# Patient Record
Sex: FEMALE | Race: WHITE
[De-identification: names, ages, dates, MRNs, and addresses within clinical notes are randomized per-mention and may not be internally consistent; named-entity substitution may affect disease eponyms.]

---

## 2017-03-15 ENCOUNTER — APPOINTMENT (OUTPATIENT)
Dept: FAMILY MEDICINE | Facility: CLINIC | Age: 67
End: 2017-03-15
Payer: MEDICARE

## 2017-03-15 VITALS
BODY MASS INDEX: 34.87 KG/M2 | OXYGEN SATURATION: 95 % | HEIGHT: 66 IN | WEIGHT: 217 LBS | DIASTOLIC BLOOD PRESSURE: 73 MMHG | SYSTOLIC BLOOD PRESSURE: 166 MMHG | HEART RATE: 67 BPM | TEMPERATURE: 97.5 F

## 2017-03-15 DIAGNOSIS — Z82.49 FAMILY HISTORY OF ISCHEMIC HEART DISEASE AND OTHER DISEASES OF THE CIRCULATORY SYSTEM: ICD-10-CM

## 2017-03-15 DIAGNOSIS — I87.2 VENOUS INSUFFICIENCY (CHRONIC) (PERIPHERAL): ICD-10-CM

## 2017-03-15 DIAGNOSIS — Z85.828 PERSONAL HISTORY OF OTHER MALIGNANT NEOPLASM OF SKIN: ICD-10-CM

## 2017-03-15 DIAGNOSIS — K76.0 FATTY (CHANGE OF) LIVER, NOT ELSEWHERE CLASSIFIED: ICD-10-CM

## 2017-03-15 LAB — HBA1C MFR BLD HPLC: 7.1

## 2017-03-15 PROCEDURE — 83036 HEMOGLOBIN GLYCOSYLATED A1C: CPT | Mod: QW

## 2017-03-15 PROCEDURE — 99203 OFFICE O/P NEW LOW 30 MIN: CPT | Mod: 25

## 2017-03-15 RX ORDER — ASPIRIN 81 MG/1
81 TABLET ORAL DAILY
Qty: 90 | Refills: 1 | Status: ACTIVE | COMMUNITY
Start: 2017-03-15

## 2017-03-24 ENCOUNTER — MEDICATION RENEWAL (OUTPATIENT)
Age: 67
End: 2017-03-24

## 2017-05-11 ENCOUNTER — RX RENEWAL (OUTPATIENT)
Age: 67
End: 2017-05-11

## 2017-05-15 ENCOUNTER — RX RENEWAL (OUTPATIENT)
Age: 67
End: 2017-05-15

## 2017-06-05 ENCOUNTER — APPOINTMENT (OUTPATIENT)
Dept: FAMILY MEDICINE | Facility: CLINIC | Age: 67
End: 2017-06-05
Payer: MEDICARE

## 2017-06-05 VITALS
WEIGHT: 215 LBS | DIASTOLIC BLOOD PRESSURE: 74 MMHG | SYSTOLIC BLOOD PRESSURE: 137 MMHG | HEART RATE: 67 BPM | TEMPERATURE: 98 F | BODY MASS INDEX: 34.55 KG/M2 | OXYGEN SATURATION: 94 % | HEIGHT: 66 IN

## 2017-06-05 LAB — HBA1C MFR BLD HPLC: 6.6

## 2017-06-05 PROCEDURE — 36415 COLL VENOUS BLD VENIPUNCTURE: CPT

## 2017-06-05 PROCEDURE — 83036 HEMOGLOBIN GLYCOSYLATED A1C: CPT | Mod: QW

## 2017-06-05 PROCEDURE — 99214 OFFICE O/P EST MOD 30 MIN: CPT | Mod: 25

## 2017-06-09 LAB
ALBUMIN SERPL ELPH-MCNC: 4.1 G/DL
ALP BLD-CCNC: 113 U/L
ALT SERPL-CCNC: 9 U/L
ANION GAP SERPL CALC-SCNC: 23 MMOL/L
AST SERPL-CCNC: 14 U/L
BILIRUB SERPL-MCNC: 0.3 MG/DL
BUN SERPL-MCNC: 18 MG/DL
CALCIUM SERPL-MCNC: 10 MG/DL
CHLORIDE SERPL-SCNC: 90 MMOL/L
CHOLEST SERPL-MCNC: 161 MG/DL
CHOLEST/HDLC SERPL: 2.2 RATIO
CO2 SERPL-SCNC: 21 MMOL/L
CREAT SERPL-MCNC: 0.96 MG/DL
GLUCOSE SERPL-MCNC: 125 MG/DL
HDLC SERPL-MCNC: 72 MG/DL
LDLC SERPL CALC-MCNC: 77 MG/DL
POTASSIUM SERPL-SCNC: 3.9 MMOL/L
PROT SERPL-MCNC: 7.7 G/DL
SODIUM SERPL-SCNC: 134 MMOL/L
TRIGL SERPL-MCNC: 61 MG/DL

## 2017-06-13 ENCOUNTER — RX RENEWAL (OUTPATIENT)
Age: 67
End: 2017-06-13

## 2017-08-02 ENCOUNTER — RX RENEWAL (OUTPATIENT)
Age: 67
End: 2017-08-02

## 2017-08-21 ENCOUNTER — RX RENEWAL (OUTPATIENT)
Age: 67
End: 2017-08-21

## 2017-09-06 ENCOUNTER — APPOINTMENT (OUTPATIENT)
Dept: FAMILY MEDICINE | Facility: CLINIC | Age: 67
End: 2017-09-06
Payer: MEDICARE

## 2017-09-06 ENCOUNTER — MED ADMIN CHARGE (OUTPATIENT)
Age: 67
End: 2017-09-06

## 2017-09-06 VITALS
DIASTOLIC BLOOD PRESSURE: 73 MMHG | HEIGHT: 66 IN | WEIGHT: 217.8 LBS | HEART RATE: 70 BPM | SYSTOLIC BLOOD PRESSURE: 120 MMHG | BODY MASS INDEX: 35 KG/M2 | OXYGEN SATURATION: 95 % | TEMPERATURE: 97.9 F

## 2017-09-06 LAB — HBA1C MFR BLD HPLC: 6.7

## 2017-09-06 PROCEDURE — 99214 OFFICE O/P EST MOD 30 MIN: CPT | Mod: 25

## 2017-09-06 PROCEDURE — 90662 IIV NO PRSV INCREASED AG IM: CPT

## 2017-09-06 PROCEDURE — 83036 HEMOGLOBIN GLYCOSYLATED A1C: CPT | Mod: QW

## 2017-09-06 PROCEDURE — G0008: CPT

## 2017-09-18 ENCOUNTER — RX RENEWAL (OUTPATIENT)
Age: 67
End: 2017-09-18

## 2017-09-18 RX ORDER — ROSUVASTATIN CALCIUM 10 MG/1
10 TABLET, FILM COATED ORAL
Qty: 90 | Refills: 1 | Status: DISCONTINUED | COMMUNITY
Start: 2017-03-15 | End: 2017-09-18

## 2017-09-22 ENCOUNTER — APPOINTMENT (OUTPATIENT)
Dept: CHRONIC DISEASE MANAGEMENT | Facility: CLINIC | Age: 67
End: 2017-09-22

## 2017-12-06 ENCOUNTER — APPOINTMENT (OUTPATIENT)
Dept: FAMILY MEDICINE | Facility: CLINIC | Age: 67
End: 2017-12-06
Payer: MEDICARE

## 2017-12-06 VITALS
WEIGHT: 215 LBS | HEIGHT: 66 IN | TEMPERATURE: 97.7 F | DIASTOLIC BLOOD PRESSURE: 79 MMHG | HEART RATE: 87 BPM | BODY MASS INDEX: 34.55 KG/M2 | OXYGEN SATURATION: 95 % | SYSTOLIC BLOOD PRESSURE: 160 MMHG

## 2017-12-06 LAB — HBA1C MFR BLD HPLC: 7.1

## 2017-12-06 PROCEDURE — G0438: CPT

## 2017-12-06 PROCEDURE — G0402 INITIAL PREVENTIVE EXAM: CPT

## 2017-12-06 PROCEDURE — 36415 COLL VENOUS BLD VENIPUNCTURE: CPT

## 2017-12-06 PROCEDURE — 83036 HEMOGLOBIN GLYCOSYLATED A1C: CPT | Mod: QW

## 2017-12-07 LAB
25(OH)D3 SERPL-MCNC: 34.5 NG/ML
ALBUMIN SERPL ELPH-MCNC: 4.3 G/DL
ALP BLD-CCNC: 117 U/L
ALT SERPL-CCNC: 10 U/L
ANION GAP SERPL CALC-SCNC: 16 MMOL/L
AST SERPL-CCNC: 14 U/L
BASOPHILS # BLD AUTO: 0.02 K/UL
BASOPHILS NFR BLD AUTO: 0.3 %
BILIRUB SERPL-MCNC: 0.4 MG/DL
BUN SERPL-MCNC: 16 MG/DL
CALCIUM SERPL-MCNC: 10.1 MG/DL
CHLORIDE SERPL-SCNC: 95 MMOL/L
CHOLEST SERPL-MCNC: 174 MG/DL
CHOLEST/HDLC SERPL: 2.4 RATIO
CO2 SERPL-SCNC: 25 MMOL/L
CREAT SERPL-MCNC: 0.91 MG/DL
CREAT SPEC-SCNC: 201 MG/DL
EOSINOPHIL # BLD AUTO: 0.21 K/UL
EOSINOPHIL NFR BLD AUTO: 3.1 %
GLUCOSE SERPL-MCNC: 142 MG/DL
HCT VFR BLD CALC: 37.7 %
HDLC SERPL-MCNC: 74 MG/DL
HGB BLD-MCNC: 12.7 G/DL
IMM GRANULOCYTES NFR BLD AUTO: 0.1 %
LDLC SERPL CALC-MCNC: 85 MG/DL
LYMPHOCYTES # BLD AUTO: 1.31 K/UL
LYMPHOCYTES NFR BLD AUTO: 19.2 %
MAN DIFF?: NORMAL
MCHC RBC-ENTMCNC: 28.5 PG
MCHC RBC-ENTMCNC: 33.7 GM/DL
MCV RBC AUTO: 84.7 FL
MICROALBUMIN 24H UR DL<=1MG/L-MCNC: 4.4 MG/DL
MICROALBUMIN/CREAT 24H UR-RTO: 22 MG/G
MONOCYTES # BLD AUTO: 0.5 K/UL
MONOCYTES NFR BLD AUTO: 7.3 %
NEUTROPHILS # BLD AUTO: 4.77 K/UL
NEUTROPHILS NFR BLD AUTO: 70 %
PLATELET # BLD AUTO: 356 K/UL
POTASSIUM SERPL-SCNC: 4.4 MMOL/L
PROT SERPL-MCNC: 8.3 G/DL
RBC # BLD: 4.45 M/UL
RBC # FLD: 13.5 %
SODIUM SERPL-SCNC: 136 MMOL/L
TRIGL SERPL-MCNC: 75 MG/DL
TSH SERPL-ACNC: 4.43 UIU/ML
WBC # FLD AUTO: 6.82 K/UL

## 2017-12-11 ENCOUNTER — RX RENEWAL (OUTPATIENT)
Age: 67
End: 2017-12-11

## 2018-03-12 ENCOUNTER — RX RENEWAL (OUTPATIENT)
Age: 68
End: 2018-03-12

## 2018-03-21 ENCOUNTER — APPOINTMENT (OUTPATIENT)
Dept: FAMILY MEDICINE | Facility: CLINIC | Age: 68
End: 2018-03-21
Payer: MEDICARE

## 2018-03-21 VITALS
DIASTOLIC BLOOD PRESSURE: 78 MMHG | BODY MASS INDEX: 34.55 KG/M2 | HEIGHT: 66 IN | SYSTOLIC BLOOD PRESSURE: 147 MMHG | OXYGEN SATURATION: 95 % | WEIGHT: 215 LBS | HEART RATE: 75 BPM | TEMPERATURE: 97.9 F

## 2018-03-21 LAB — HBA1C MFR BLD HPLC: 7.2

## 2018-03-21 PROCEDURE — 83036 HEMOGLOBIN GLYCOSYLATED A1C: CPT | Mod: QW

## 2018-03-21 PROCEDURE — 99214 OFFICE O/P EST MOD 30 MIN: CPT | Mod: 25

## 2018-03-21 PROCEDURE — 93000 ELECTROCARDIOGRAM COMPLETE: CPT

## 2018-05-18 ENCOUNTER — RX RENEWAL (OUTPATIENT)
Age: 68
End: 2018-05-18

## 2018-05-19 ENCOUNTER — RX RENEWAL (OUTPATIENT)
Age: 68
End: 2018-05-19

## 2018-06-08 ENCOUNTER — RX RENEWAL (OUTPATIENT)
Age: 68
End: 2018-06-08

## 2018-07-16 ENCOUNTER — RX RENEWAL (OUTPATIENT)
Age: 68
End: 2018-07-16

## 2018-07-22 PROBLEM — Z85.828 HISTORY OF BASAL CELL CARCINOMA: Status: RESOLVED | Noted: 2017-03-15 | Resolved: 2018-07-22

## 2018-07-25 ENCOUNTER — MED ADMIN CHARGE (OUTPATIENT)
Age: 68
End: 2018-07-25

## 2018-07-25 ENCOUNTER — APPOINTMENT (OUTPATIENT)
Dept: FAMILY MEDICINE | Facility: CLINIC | Age: 68
End: 2018-07-25
Payer: MEDICARE

## 2018-07-25 VITALS
HEART RATE: 63 BPM | WEIGHT: 210 LBS | SYSTOLIC BLOOD PRESSURE: 143 MMHG | DIASTOLIC BLOOD PRESSURE: 64 MMHG | BODY MASS INDEX: 33.75 KG/M2 | HEIGHT: 66 IN | OXYGEN SATURATION: 97 % | TEMPERATURE: 97.9 F

## 2018-07-25 LAB — HBA1C MFR BLD HPLC: 7

## 2018-07-25 PROCEDURE — 83036 HEMOGLOBIN GLYCOSYLATED A1C: CPT | Mod: QW

## 2018-07-25 PROCEDURE — 90715 TDAP VACCINE 7 YRS/> IM: CPT | Mod: GY

## 2018-07-25 PROCEDURE — 90471 IMMUNIZATION ADMIN: CPT | Mod: GY

## 2018-07-25 PROCEDURE — 99214 OFFICE O/P EST MOD 30 MIN: CPT | Mod: 25

## 2018-07-25 PROCEDURE — 36415 COLL VENOUS BLD VENIPUNCTURE: CPT

## 2018-07-25 NOTE — PAST MEDICAL HISTORY
[Postmenopausal] : postmenopausal [Menopause Age____] : age at menopause was [unfilled] [Total Preg ___] : G[unfilled]

## 2018-07-25 NOTE — HISTORY OF PRESENT ILLNESS
[FreeTextEntry1] : DM f/up [de-identified] : 68 y/o F, w/ hx DM, HTN, Dyslipidemia, obesity, presents today for f/up in DM.\par \par Pt doing well. No complaints. Compliant w/ meds and diet. On weight watchers, not regularly.

## 2018-07-25 NOTE — ASSESSMENT
[FreeTextEntry1] : -CPE: 12/2017.\par \par -HIV/HC: refused.\par \par -HTN: well control. Continue meds.\par \par -DM: on metformin. HbA1c: 7.0%. On metformin. EKG baseline: no changes compare with previous..   Echocardio done 1/2017.w/ previous MD.\par \par -Mammo: 2018.\par \par -Colonoscopy: 2015. Done q 3 years due to polyps.\par \par -Gyn: pt will schedule appointment w/ Gyn.\par \par -Bone scan: done w/ lea SHARP.Will request reports done at [facility].\par \par -Immunizations:TDap today. \par \par -Lipid profile/CMP today.

## 2018-07-26 LAB
ALBUMIN SERPL ELPH-MCNC: 4.3 G/DL
ALP BLD-CCNC: 115 U/L
ALT SERPL-CCNC: 10 U/L
ANION GAP SERPL CALC-SCNC: 13 MMOL/L
AST SERPL-CCNC: 17 U/L
BILIRUB SERPL-MCNC: 0.4 MG/DL
BUN SERPL-MCNC: 19 MG/DL
CALCIUM SERPL-MCNC: 9.7 MG/DL
CHLORIDE SERPL-SCNC: 94 MMOL/L
CHOLEST SERPL-MCNC: 163 MG/DL
CHOLEST/HDLC SERPL: 2.4 RATIO
CO2 SERPL-SCNC: 25 MMOL/L
CREAT SERPL-MCNC: 0.99 MG/DL
GLUCOSE SERPL-MCNC: 135 MG/DL
HDLC SERPL-MCNC: 69 MG/DL
LDLC SERPL CALC-MCNC: 84 MG/DL
POTASSIUM SERPL-SCNC: 4.5 MMOL/L
PROT SERPL-MCNC: 8.2 G/DL
SODIUM SERPL-SCNC: 132 MMOL/L
TRIGL SERPL-MCNC: 50 MG/DL

## 2018-08-10 ENCOUNTER — APPOINTMENT (OUTPATIENT)
Dept: FAMILY MEDICINE | Facility: CLINIC | Age: 68
End: 2018-08-10
Payer: MEDICARE

## 2018-08-10 VITALS
TEMPERATURE: 98.9 F | HEIGHT: 66 IN | OXYGEN SATURATION: 95 % | SYSTOLIC BLOOD PRESSURE: 149 MMHG | HEART RATE: 75 BPM | BODY MASS INDEX: 34.72 KG/M2 | WEIGHT: 216 LBS | DIASTOLIC BLOOD PRESSURE: 66 MMHG

## 2018-08-10 PROCEDURE — 99214 OFFICE O/P EST MOD 30 MIN: CPT

## 2018-08-10 NOTE — ASSESSMENT
[FreeTextEntry1] : Pt presents today for acuite evaluation of dizziness w/ Impression:\par \par -benign Positional vertigo: EKG done: no changes. Start meclizine. ENT referral.\par \par -CPE: 12/2017.\par \par -HIV/HC: refused.\par \par -HTN: well control. Continue meds.\par \par -DM: on metformin. HbA1c: 7.0%. On metformin. EKG baseline: no changes compare with previous.. Echocardio done 1/2017.w/ previous MD.\par \par -Mammo: 2018.\par \par -Colonoscopy: 2015. Done q 3 years due to polyps.\par \par -Gyn: pt will schedule appointment w/ Gyn.\par \par -Bone scan: done w/ lea SHARP.Will request reports done at [facility].\par \par -Immunizations:TDap today. \par

## 2018-08-10 NOTE — REVIEW OF SYSTEMS
[Dizziness] : dizziness [Headache] : no headache [Fainting] : no fainting [Confusion] : no confusion [Memory Loss] : no memory loss [Unsteady Walking] : no ataxia

## 2018-08-10 NOTE — HISTORY OF PRESENT ILLNESS
[FreeTextEntry8] : Pt presents today for evaluation of lightheadedness, dizziness for 5 days.\par Pt reports + nausea when walking. Fine when driving.\par States feels dizzy when lying down or moving head too fast, and resolved in seconds.\par \par Pt reports similar episode in the past, aprox > 20 years ago. Seen by Neurology and had many tests done : all wnl.\par \par Pt denies CP , palpitations or blurred vision.

## 2018-08-22 ENCOUNTER — APPOINTMENT (OUTPATIENT)
Dept: FAMILY MEDICINE | Facility: CLINIC | Age: 68
End: 2018-08-22

## 2018-08-27 ENCOUNTER — RX RENEWAL (OUTPATIENT)
Age: 68
End: 2018-08-27

## 2018-10-19 ENCOUNTER — RX RENEWAL (OUTPATIENT)
Age: 68
End: 2018-10-19

## 2018-10-29 ENCOUNTER — RX RENEWAL (OUTPATIENT)
Age: 68
End: 2018-10-29

## 2018-11-02 ENCOUNTER — APPOINTMENT (OUTPATIENT)
Dept: FAMILY MEDICINE | Facility: CLINIC | Age: 68
End: 2018-11-02
Payer: MEDICARE

## 2018-11-02 VITALS
WEIGHT: 210 LBS | BODY MASS INDEX: 33.75 KG/M2 | OXYGEN SATURATION: 97 % | SYSTOLIC BLOOD PRESSURE: 140 MMHG | TEMPERATURE: 97.8 F | HEART RATE: 64 BPM | DIASTOLIC BLOOD PRESSURE: 67 MMHG | HEIGHT: 66 IN

## 2018-11-02 LAB — HBA1C MFR BLD HPLC: 6.9

## 2018-11-02 PROCEDURE — 83036 HEMOGLOBIN GLYCOSYLATED A1C: CPT | Mod: QW

## 2018-11-02 PROCEDURE — 36415 COLL VENOUS BLD VENIPUNCTURE: CPT

## 2018-11-02 PROCEDURE — 99214 OFFICE O/P EST MOD 30 MIN: CPT | Mod: 25

## 2018-11-02 RX ORDER — POTASSIUM CHLORIDE 1500 MG/1
20 TABLET, EXTENDED RELEASE ORAL DAILY
Qty: 90 | Refills: 1 | Status: DISCONTINUED | COMMUNITY
Start: 2018-10-19 | End: 2018-11-02

## 2018-11-02 RX ORDER — MECLIZINE HYDROCHLORIDE 12.5 MG/1
12.5 TABLET ORAL
Qty: 30 | Refills: 0 | Status: DISCONTINUED | COMMUNITY
Start: 2018-08-10 | End: 2018-11-02

## 2018-11-02 RX ORDER — POTASSIUM CHLORIDE 1500 MG/1
20 TABLET, FILM COATED, EXTENDED RELEASE ORAL DAILY
Qty: 90 | Refills: 3 | Status: DISCONTINUED | COMMUNITY
Start: 2017-05-11 | End: 2018-11-02

## 2018-11-02 NOTE — HISTORY OF PRESENT ILLNESS
[FreeTextEntry1] : DM f/up [de-identified] : 67 y/o F, w/ hx DM, HTN, Dyslipidemia, obesity, presents today for f/up in DM.\par \par Pt doing well. No complaints. reports to resolved vertigo after 6 weeks. Seen by ENT.\par States d/c Potassium supplement > 1 week ago.\par reports an episode of fluttering, resolved spontaneously, not associated with CP. denies similar episodes in the past.

## 2018-11-02 NOTE — ASSESSMENT
[FreeTextEntry1] : -CPE: 12/2017. She will schedule CPE.\par \par -HIV/HC: refused.\par \par -HTN: well control. Continue meds.\par \par -DM: on metformin. HbA1c: 6.9%. On metformin. \par \par -Mammo: 2018.\par \par -Colonoscopy: 2015. Done q 3 years due to polyps.\par \par -Gyn: pt will schedule appointment w/ Gyn.\par \par -Bone scan: 2/2018\par \par -Immunizations:up to date\par \par -D/C potassium supplement > 1 week ago: bmp.

## 2018-11-06 LAB
ANION GAP SERPL CALC-SCNC: 13 MMOL/L
BUN SERPL-MCNC: 16 MG/DL
CALCIUM SERPL-MCNC: 10.1 MG/DL
CHLORIDE SERPL-SCNC: 93 MMOL/L
CO2 SERPL-SCNC: 27 MMOL/L
CREAT SERPL-MCNC: 0.88 MG/DL
GLUCOSE SERPL-MCNC: 126 MG/DL
POTASSIUM SERPL-SCNC: 4.1 MMOL/L
SODIUM SERPL-SCNC: 133 MMOL/L

## 2018-11-12 ENCOUNTER — RX RENEWAL (OUTPATIENT)
Age: 68
End: 2018-11-12

## 2019-01-10 ENCOUNTER — RX RENEWAL (OUTPATIENT)
Age: 69
End: 2019-01-10

## 2019-02-08 ENCOUNTER — RX RENEWAL (OUTPATIENT)
Age: 69
End: 2019-02-08

## 2019-02-12 ENCOUNTER — RX RENEWAL (OUTPATIENT)
Age: 69
End: 2019-02-12

## 2019-02-22 ENCOUNTER — MEDICATION RENEWAL (OUTPATIENT)
Age: 69
End: 2019-02-22

## 2019-02-24 ENCOUNTER — RX RENEWAL (OUTPATIENT)
Age: 69
End: 2019-02-24

## 2019-02-25 ENCOUNTER — APPOINTMENT (OUTPATIENT)
Dept: FAMILY MEDICINE | Facility: CLINIC | Age: 69
End: 2019-02-25
Payer: MEDICARE

## 2019-02-25 VITALS
HEIGHT: 66 IN | HEART RATE: 59 BPM | DIASTOLIC BLOOD PRESSURE: 57 MMHG | BODY MASS INDEX: 33.43 KG/M2 | TEMPERATURE: 97.6 F | SYSTOLIC BLOOD PRESSURE: 128 MMHG | OXYGEN SATURATION: 96 % | WEIGHT: 208 LBS

## 2019-02-25 LAB — HBA1C MFR BLD HPLC: 6.7

## 2019-02-25 PROCEDURE — 83036 HEMOGLOBIN GLYCOSYLATED A1C: CPT | Mod: QW

## 2019-02-25 PROCEDURE — G0439: CPT

## 2019-02-25 PROCEDURE — 99397 PER PM REEVAL EST PAT 65+ YR: CPT | Mod: 25

## 2019-02-25 PROCEDURE — 36415 COLL VENOUS BLD VENIPUNCTURE: CPT

## 2019-02-25 RX ORDER — POTASSIUM CHLORIDE 1500 MG/1
20 TABLET, EXTENDED RELEASE ORAL DAILY
Qty: 90 | Refills: 1 | Status: DISCONTINUED | COMMUNITY
Start: 2017-03-15 | End: 2019-02-25

## 2019-02-25 NOTE — ASSESSMENT
[FreeTextEntry1] : -CPE: blood and UA today\par \par -HIV/HC: refused.\par \par -HTN: well control. Continue meds.\par \par -DM: on metformin. HbA1c: 6.7%. On metformin. \par \par -Mammo: 2019. BIRAD 2.\par \par -Colonoscopy: 2015. Done q 3 years due to polyps.\par \par -Gyn: pt will schedule appointment w/ Gyn.\par \par -Bone scan: 2/2018: osteopenia\par \par -Immunizations:up to date.\par Shingrix next visit.\par \par -Seen by cardiology: will request report\par \par

## 2019-02-25 NOTE — HEALTH RISK ASSESSMENT
[Good] : ~his/her~  mood as  good [No falls in past year] : Patient reported no falls in the past year [0] : 2) Feeling down, depressed, or hopeless: Not at all (0) [No Retinopathy] : No retinopathy [Patient reported mammogram was normal] : Patient reported mammogram was normal [Patient reported bone density results were abnormal] : Patient reported bone density results were abnormal [Patient reported colonoscopy was abnormal] : Patient reported colonoscopy was abnormal [HIV test declined] : HIV test declined [Hepatitis C test declined] : Hepatitis C test declined [None] : None [Alone] : lives alone [Employed] : employed [# Of Children ___] : has [unfilled] children [Feels Safe at Home] : Feels safe at home [Fully functional (bathing, dressing, toileting, transferring, walking, feeding)] : Fully functional (bathing, dressing, toileting, transferring, walking, feeding) [Fully functional (using the telephone, shopping, preparing meals, housekeeping, doing laundry, using] : Fully functional and needs no help or supervision to perform IADLs (using the telephone, shopping, preparing meals, housekeeping, doing laundry, using transportation, managing medications and managing finances) [Smoke Detector] : smoke detector [Seat Belt] :  uses seat belt [Designated Healthcare Proxy] : Designated healthcare proxy [Name: ___] : Health Care Proxy's Name: [unfilled]  [Relationship: ___] : Relationship: [unfilled] [] : No [de-identified] : Walks [de-identified] : diabetic not 100% compliants [EyeExamDate] : 2018 [Change in mental status noted] : No change in mental status noted [Language] : denies difficulty with language [Handling Complex Tasks] : denies difficulty handling complex tasks [Sexually Active] : not sexually active [Reports changes in hearing] : Reports no changes in hearing [Reports changes in vision] : Reports no changes in vision [Reports changes in dental health] : Reports no changes in dental health [MammogramDate] : 2019 [BoneDensityDate] : 2018 [BoneDensityComments] : osteopenia [ColonoscopyDate] : 2015 [ColonoscopyComments] : GI: Dr Manzano in [place] [FreeTextEntry2] : Per smitha at [facility] [AdvancecareDate] : 2/25/19

## 2019-02-25 NOTE — HISTORY OF PRESENT ILLNESS
[FreeTextEntry1] : Annual physical [de-identified] : 69 y/o F, w/ hx DM, HTN, Dyslipidemia, obesity, presents today for annual physical\par \par Pt doing well. No complaints. reports to resolved vertigo after 6 weeks. Seen by ENT.\par Seen by Cardiology recently at [facility]. \par  \par

## 2019-03-01 LAB
25(OH)D3 SERPL-MCNC: 41.4 NG/ML
ALBUMIN SERPL ELPH-MCNC: 4.5 G/DL
ALP BLD-CCNC: 125 U/L
ALT SERPL-CCNC: 12 U/L
ANION GAP SERPL CALC-SCNC: 12 MMOL/L
AST SERPL-CCNC: 13 U/L
BASOPHILS # BLD AUTO: 0.05 K/UL
BASOPHILS NFR BLD AUTO: 0.8 %
BILIRUB SERPL-MCNC: 0.4 MG/DL
BUN SERPL-MCNC: 17 MG/DL
CALCIUM SERPL-MCNC: 9.7 MG/DL
CHLORIDE SERPL-SCNC: 94 MMOL/L
CHOLEST SERPL-MCNC: 170 MG/DL
CHOLEST/HDLC SERPL: 2.2 RATIO
CO2 SERPL-SCNC: 25 MMOL/L
CREAT SERPL-MCNC: 0.88 MG/DL
CREAT SPEC-SCNC: 171 MG/DL
EOSINOPHIL # BLD AUTO: 0.37 K/UL
EOSINOPHIL NFR BLD AUTO: 5.9 %
GLUCOSE SERPL-MCNC: 150 MG/DL
HCT VFR BLD CALC: 39 %
HDLC SERPL-MCNC: 76 MG/DL
HGB BLD-MCNC: 12.5 G/DL
IMM GRANULOCYTES NFR BLD AUTO: 0.2 %
LDLC SERPL CALC-MCNC: 80 MG/DL
LYMPHOCYTES # BLD AUTO: 1.23 K/UL
LYMPHOCYTES NFR BLD AUTO: 19.5 %
MAN DIFF?: NORMAL
MCHC RBC-ENTMCNC: 28 PG
MCHC RBC-ENTMCNC: 32.1 GM/DL
MCV RBC AUTO: 87.2 FL
MICROALBUMIN 24H UR DL<=1MG/L-MCNC: 4.7 MG/DL
MICROALBUMIN/CREAT 24H UR-RTO: 28 MG/G
MONOCYTES # BLD AUTO: 0.48 K/UL
MONOCYTES NFR BLD AUTO: 7.6 %
NEUTROPHILS # BLD AUTO: 4.16 K/UL
NEUTROPHILS NFR BLD AUTO: 66 %
PLATELET # BLD AUTO: 246 K/UL
POTASSIUM SERPL-SCNC: 4 MMOL/L
PROT SERPL-MCNC: 7.5 G/DL
RBC # BLD: 4.47 M/UL
RBC # FLD: 13.2 %
SODIUM SERPL-SCNC: 131 MMOL/L
TRIGL SERPL-MCNC: 70 MG/DL
TSH SERPL-ACNC: 4.94 UIU/ML
WBC # FLD AUTO: 6.3 K/UL

## 2019-04-05 ENCOUNTER — RX RENEWAL (OUTPATIENT)
Age: 69
End: 2019-04-05

## 2019-05-08 ENCOUNTER — RX RENEWAL (OUTPATIENT)
Age: 69
End: 2019-05-08

## 2019-06-03 ENCOUNTER — MED ADMIN CHARGE (OUTPATIENT)
Age: 69
End: 2019-06-03

## 2019-06-03 ENCOUNTER — APPOINTMENT (OUTPATIENT)
Dept: FAMILY MEDICINE | Facility: CLINIC | Age: 69
End: 2019-06-03
Payer: MEDICARE

## 2019-06-03 VITALS
HEIGHT: 66 IN | DIASTOLIC BLOOD PRESSURE: 64 MMHG | WEIGHT: 206 LBS | BODY MASS INDEX: 33.11 KG/M2 | HEART RATE: 69 BPM | TEMPERATURE: 97.5 F | SYSTOLIC BLOOD PRESSURE: 112 MMHG | OXYGEN SATURATION: 94 %

## 2019-06-03 LAB — HBA1C MFR BLD HPLC: 6.8

## 2019-06-03 PROCEDURE — 99214 OFFICE O/P EST MOD 30 MIN: CPT | Mod: 25

## 2019-06-03 PROCEDURE — 90471 IMMUNIZATION ADMIN: CPT

## 2019-06-03 PROCEDURE — 83036 HEMOGLOBIN GLYCOSYLATED A1C: CPT | Mod: QW

## 2019-06-03 PROCEDURE — 90750 HZV VACC RECOMBINANT IM: CPT

## 2019-06-03 NOTE — PHYSICAL EXAM
[No Acute Distress] : no acute distress [Well Nourished] : well nourished [Well-Appearing] : well-appearing [Well Developed] : well developed [Normal Sclera/Conjunctiva] : normal sclera/conjunctiva [PERRL] : pupils equal round and reactive to light [EOMI] : extraocular movements intact [Normal Outer Ear/Nose] : the outer ears and nose were normal in appearance [Normal Oropharynx] : the oropharynx was normal [No JVD] : no jugular venous distention [Supple] : supple [No Lymphadenopathy] : no lymphadenopathy [Thyroid Normal, No Nodules] : the thyroid was normal and there were no nodules present [No Respiratory Distress] : no respiratory distress  [Clear to Auscultation] : lungs were clear to auscultation bilaterally [No Accessory Muscle Use] : no accessory muscle use [Normal Rate] : normal rate  [Regular Rhythm] : with a regular rhythm [Normal S1, S2] : normal S1 and S2 [No Murmur] : no murmur heard [No Carotid Bruits] : no carotid bruits [No Abdominal Bruit] : a ~M bruit was not heard ~T in the abdomen [No Varicosities] : no varicosities [Pedal Pulses Present] : the pedal pulses are present [No Edema] : there was no peripheral edema [No Extremity Clubbing/Cyanosis] : no extremity clubbing/cyanosis [No Palpable Aorta] : no palpable aorta [Soft] : abdomen soft [Non Tender] : non-tender [No Masses] : no abdominal mass palpated [Non-distended] : non-distended [No HSM] : no HSM [Normal Bowel Sounds] : normal bowel sounds [Normal Posterior Cervical Nodes] : no posterior cervical lymphadenopathy [Normal Anterior Cervical Nodes] : no anterior cervical lymphadenopathy [No CVA Tenderness] : no CVA  tenderness [No Spinal Tenderness] : no spinal tenderness [Grossly Normal Strength/Tone] : grossly normal strength/tone [No Joint Swelling] : no joint swelling [Normal Gait] : normal gait [No Rash] : no rash [Coordination Grossly Intact] : coordination grossly intact [No Focal Deficits] : no focal deficits [Deep Tendon Reflexes (DTR)] : deep tendon reflexes were 2+ and symmetric [Normal Insight/Judgement] : insight and judgment were intact [Normal Affect] : the affect was normal

## 2019-06-03 NOTE — HISTORY OF PRESENT ILLNESS
[FreeTextEntry1] : DM f/up [de-identified] : 68 y/o F, w/  DM well control, HTN, Dyslipidemia, obesity, presents today for f/up in DM.\par \par Pt doing well. No complaints.

## 2019-06-03 NOTE — ASSESSMENT
[FreeTextEntry1] : -CPE: 2/2019\par \par -HIV/HC: refused.\par \par -HTN: well control. Continue meds.\par \par -DM: well control.\par  HbA1c: 6.8%.\par  On metformin. \par \par -Mammo: 2018.\par \par -Colonoscopy: 2015. Done q 3 years due to polyps.\par \par -Gyn: pt will schedule appointment w/ Gyn.\par \par -Bone scan: 2/2018\par \par -Immunizations:up to date\par Shingrix 1st dose today. 2nd dose in 3 months.\par \par Hx Basocel cac:\par -seen by Dermatology, Mony Massey.\par \par

## 2019-07-08 ENCOUNTER — RX RENEWAL (OUTPATIENT)
Age: 69
End: 2019-07-08

## 2019-08-02 ENCOUNTER — RX RENEWAL (OUTPATIENT)
Age: 69
End: 2019-08-02

## 2019-10-04 ENCOUNTER — RX RENEWAL (OUTPATIENT)
Age: 69
End: 2019-10-04

## 2019-10-10 ENCOUNTER — RX RENEWAL (OUTPATIENT)
Age: 69
End: 2019-10-10

## 2019-10-23 ENCOUNTER — MED ADMIN CHARGE (OUTPATIENT)
Age: 69
End: 2019-10-23

## 2019-10-23 ENCOUNTER — APPOINTMENT (OUTPATIENT)
Dept: FAMILY MEDICINE | Facility: CLINIC | Age: 69
End: 2019-10-23
Payer: MEDICARE

## 2019-10-23 VITALS
HEART RATE: 74 BPM | WEIGHT: 194 LBS | DIASTOLIC BLOOD PRESSURE: 68 MMHG | HEIGHT: 66 IN | TEMPERATURE: 98.1 F | OXYGEN SATURATION: 97 % | BODY MASS INDEX: 31.18 KG/M2 | SYSTOLIC BLOOD PRESSURE: 127 MMHG

## 2019-10-23 LAB — HBA1C MFR BLD HPLC: 6.1

## 2019-10-23 PROCEDURE — 90750 HZV VACC RECOMBINANT IM: CPT | Mod: GY

## 2019-10-23 PROCEDURE — 83036 HEMOGLOBIN GLYCOSYLATED A1C: CPT | Mod: QW

## 2019-10-23 PROCEDURE — 90471 IMMUNIZATION ADMIN: CPT | Mod: GY

## 2019-10-23 PROCEDURE — 99214 OFFICE O/P EST MOD 30 MIN: CPT | Mod: 25

## 2019-10-23 NOTE — HISTORY OF PRESENT ILLNESS
[FreeTextEntry1] : DM f/up\par 2nd dose shingrix [de-identified] : 68 y/o F, w/  DM well control, HTN, Dyslipidemia, obesity, presents today for f/up in DM.\par \par Pt doing well. No complaints.

## 2019-10-23 NOTE — ASSESSMENT
[FreeTextEntry1] : -CPE: 2/2019\par \par -HIV/HC: refused.\par \par -HTN: well control. Continue meds.\par \par -DM: well control.\par  HbA1c: 6.8%.\par  On metformin. \par Podiatry: seen by [facility]. 08/2019\par \par -Mammo: 2018.\par \par -Colonoscopy: 2015. Done q 3 years due to polyps.\par \par -Gyn: pt will schedule appointment w/ Gyn.\par \par -Bone scan: 2/2018\par \par -Immunizations:up to date\par Shingrix 2nd dose today.\par \par Hx Basocel cac:/ Squamous cell Ca.\par -seen by Dermatology, Mony Massey.\par \par

## 2019-10-23 NOTE — PHYSICAL EXAM
[No Acute Distress] : no acute distress [Well Nourished] : well nourished [Well Developed] : well developed [Well-Appearing] : well-appearing [Normal Sclera/Conjunctiva] : normal sclera/conjunctiva [PERRL] : pupils equal round and reactive to light [Normal Outer Ear/Nose] : the outer ears and nose were normal in appearance [EOMI] : extraocular movements intact [No JVD] : no jugular venous distention [Normal Oropharynx] : the oropharynx was normal [Supple] : supple [Thyroid Normal, No Nodules] : the thyroid was normal and there were no nodules present [No Lymphadenopathy] : no lymphadenopathy [No Respiratory Distress] : no respiratory distress  [Clear to Auscultation] : lungs were clear to auscultation bilaterally [No Accessory Muscle Use] : no accessory muscle use [Normal Rate] : normal rate  [Regular Rhythm] : with a regular rhythm [Normal S1, S2] : normal S1 and S2 [No Carotid Bruits] : no carotid bruits [No Murmur] : no murmur heard [No Abdominal Bruit] : a ~M bruit was not heard ~T in the abdomen [No Varicosities] : no varicosities [No Edema] : there was no peripheral edema [Pedal Pulses Present] : the pedal pulses are present [No Extremity Clubbing/Cyanosis] : no extremity clubbing/cyanosis [No Palpable Aorta] : no palpable aorta [Soft] : abdomen soft [Non Tender] : non-tender [Non-distended] : non-distended [No Masses] : no abdominal mass palpated [No HSM] : no HSM [Normal Bowel Sounds] : normal bowel sounds [Normal Posterior Cervical Nodes] : no posterior cervical lymphadenopathy [Normal Anterior Cervical Nodes] : no anterior cervical lymphadenopathy [No Spinal Tenderness] : no spinal tenderness [No CVA Tenderness] : no CVA  tenderness [No Joint Swelling] : no joint swelling [Grossly Normal Strength/Tone] : grossly normal strength/tone [Normal Gait] : normal gait [No Rash] : no rash [Coordination Grossly Intact] : coordination grossly intact [No Focal Deficits] : no focal deficits [Normal Affect] : the affect was normal [Deep Tendon Reflexes (DTR)] : deep tendon reflexes were 2+ and symmetric [Normal Insight/Judgement] : insight and judgment were intact

## 2019-10-28 ENCOUNTER — RX RENEWAL (OUTPATIENT)
Age: 69
End: 2019-10-28

## 2019-10-30 ENCOUNTER — RX RENEWAL (OUTPATIENT)
Age: 69
End: 2019-10-30

## 2019-11-01 ENCOUNTER — RX RENEWAL (OUTPATIENT)
Age: 69
End: 2019-11-01

## 2019-11-04 ENCOUNTER — RX RENEWAL (OUTPATIENT)
Age: 69
End: 2019-11-04

## 2019-12-30 ENCOUNTER — RX RENEWAL (OUTPATIENT)
Age: 69
End: 2019-12-30

## 2020-01-02 ENCOUNTER — RX RENEWAL (OUTPATIENT)
Age: 70
End: 2020-01-02

## 2020-01-08 ENCOUNTER — APPOINTMENT (OUTPATIENT)
Dept: FAMILY MEDICINE | Facility: CLINIC | Age: 70
End: 2020-01-08
Payer: MEDICARE

## 2020-01-08 VITALS
HEIGHT: 66 IN | DIASTOLIC BLOOD PRESSURE: 70 MMHG | WEIGHT: 185 LBS | HEART RATE: 57 BPM | OXYGEN SATURATION: 98 % | BODY MASS INDEX: 29.73 KG/M2 | SYSTOLIC BLOOD PRESSURE: 161 MMHG | TEMPERATURE: 98.2 F

## 2020-01-08 VITALS — DIASTOLIC BLOOD PRESSURE: 70 MMHG | SYSTOLIC BLOOD PRESSURE: 140 MMHG

## 2020-01-08 LAB — HBA1C MFR BLD HPLC: 5.9

## 2020-01-08 PROCEDURE — 99214 OFFICE O/P EST MOD 30 MIN: CPT | Mod: 25

## 2020-01-08 PROCEDURE — 83036 HEMOGLOBIN GLYCOSYLATED A1C: CPT | Mod: QW

## 2020-01-08 NOTE — PHYSICAL EXAM
[No Acute Distress] : no acute distress [Well Nourished] : well nourished [Well Developed] : well developed [Well-Appearing] : well-appearing [Normal Sclera/Conjunctiva] : normal sclera/conjunctiva [PERRL] : pupils equal round and reactive to light [EOMI] : extraocular movements intact [Normal Oropharynx] : the oropharynx was normal [Normal Outer Ear/Nose] : the outer ears and nose were normal in appearance [Supple] : supple [No Lymphadenopathy] : no lymphadenopathy [No JVD] : no jugular venous distention [Thyroid Normal, No Nodules] : the thyroid was normal and there were no nodules present [No Respiratory Distress] : no respiratory distress  [Clear to Auscultation] : lungs were clear to auscultation bilaterally [Normal Rate] : normal rate  [Regular Rhythm] : with a regular rhythm [No Accessory Muscle Use] : no accessory muscle use [Normal S1, S2] : normal S1 and S2 [No Murmur] : no murmur heard [No Carotid Bruits] : no carotid bruits [No Abdominal Bruit] : a ~M bruit was not heard ~T in the abdomen [No Varicosities] : no varicosities [No Edema] : there was no peripheral edema [No Extremity Clubbing/Cyanosis] : no extremity clubbing/cyanosis [Pedal Pulses Present] : the pedal pulses are present [Soft] : abdomen soft [No Palpable Aorta] : no palpable aorta [Non Tender] : non-tender [Non-distended] : non-distended [No HSM] : no HSM [No Masses] : no abdominal mass palpated [Normal Bowel Sounds] : normal bowel sounds [Normal Anterior Cervical Nodes] : no anterior cervical lymphadenopathy [Normal Posterior Cervical Nodes] : no posterior cervical lymphadenopathy [No CVA Tenderness] : no CVA  tenderness [No Spinal Tenderness] : no spinal tenderness [No Joint Swelling] : no joint swelling [No Rash] : no rash [Grossly Normal Strength/Tone] : grossly normal strength/tone [Normal Gait] : normal gait [Coordination Grossly Intact] : coordination grossly intact [No Focal Deficits] : no focal deficits [Deep Tendon Reflexes (DTR)] : deep tendon reflexes were 2+ and symmetric [Normal Affect] : the affect was normal [Normal Insight/Judgement] : insight and judgment were intact

## 2020-01-08 NOTE — HISTORY OF PRESENT ILLNESS
[FreeTextEntry1] : DM f/up\par Dizziness, vertigo [de-identified] : 70 y/o F, w/  DM well control, HTN, Dyslipidemia, obesity, presents today for f/up in DM.\par \par Pt with hx Vertigo in the past, states has been having symptoms for the last 2 weeks.\par States symptoms are when lying down, rolling head and when sitting up from lying down.\par Seen by ENT, Dr Scott Delacruz in the past. States never started Therapy.\par Mild response to meclizine 12.5mg.

## 2020-01-08 NOTE — ASSESSMENT
[FreeTextEntry1] : benign Positional vertigo:\par -Seen in the past by ENT. Dr josué Delacruz\par -Reassume meclizine 25mg tid prn.\par -Vestibular therapy referral.\par \par -CPE: 2/2019\par \par -HIV/HC: refused.\par \par -HTN: well control. Continue meds.\par On amlodipine 10mg .\par on Losartan-HCTZ 100-25mg\par -On metoprolol 25mg bid\par \par -DM: well control.\par  HbA1c: 6.8%.\par  On metformin. \par Podiatry: seen by [facility]. 08/2019\par \par -Mammo: 2018.\par \par -Colonoscopy: 2015. Done q 3 years due to polyps.\par New GI referral.\par \par -Gyn: pt will schedule appointment w/ Gyn.\par \par -Bone scan: 2/2018\par \par -Immunizations:up to date\par \par Hx Basocel cac:/ Squamous cell Ca.\par -seen by Dermatology, Mony Massey.\par \par

## 2020-01-08 NOTE — PAST MEDICAL HISTORY
[Menopause Age____] : age at menopause was [unfilled] [Postmenopausal] : postmenopausal [Total Preg ___] : G[unfilled]

## 2020-01-21 ENCOUNTER — RX RENEWAL (OUTPATIENT)
Age: 70
End: 2020-01-21

## 2020-02-03 ENCOUNTER — RX RENEWAL (OUTPATIENT)
Age: 70
End: 2020-02-03

## 2020-02-19 ENCOUNTER — APPOINTMENT (OUTPATIENT)
Dept: FAMILY MEDICINE | Facility: CLINIC | Age: 70
End: 2020-02-19
Payer: MEDICARE

## 2020-02-19 ENCOUNTER — LABORATORY RESULT (OUTPATIENT)
Age: 70
End: 2020-02-19

## 2020-02-19 VITALS
HEART RATE: 54 BPM | WEIGHT: 186 LBS | BODY MASS INDEX: 29.89 KG/M2 | SYSTOLIC BLOOD PRESSURE: 127 MMHG | DIASTOLIC BLOOD PRESSURE: 75 MMHG | OXYGEN SATURATION: 100 % | HEIGHT: 66 IN

## 2020-02-19 DIAGNOSIS — Z00.00 ENCOUNTER FOR GENERAL ADULT MEDICAL EXAMINATION W/OUT ABNORMAL FINDINGS: ICD-10-CM

## 2020-02-19 PROCEDURE — 36415 COLL VENOUS BLD VENIPUNCTURE: CPT

## 2020-02-19 PROCEDURE — G0439: CPT

## 2020-02-19 PROCEDURE — 83036 HEMOGLOBIN GLYCOSYLATED A1C: CPT | Mod: QW

## 2020-02-19 NOTE — HISTORY OF PRESENT ILLNESS
[de-identified] : 68 y/o F, w/ hx DM, HTN, Dyslipidemia, obesity, presents today for annual physical\par \par Pt doing well. No complaints. \par Seen by Cardiology recently at [facility]. \par  \par  [FreeTextEntry1] : Annual physical

## 2020-02-19 NOTE — PHYSICAL EXAM
[No Acute Distress] : no acute distress [Well Nourished] : well nourished [Well-Appearing] : well-appearing [Well Developed] : well developed [PERRL] : pupils equal round and reactive to light [Normal Sclera/Conjunctiva] : normal sclera/conjunctiva [EOMI] : extraocular movements intact [Normal Outer Ear/Nose] : the outer ears and nose were normal in appearance [Normal Oropharynx] : the oropharynx was normal [No JVD] : no jugular venous distention [Supple] : supple [Thyroid Normal, No Nodules] : the thyroid was normal and there were no nodules present [No Lymphadenopathy] : no lymphadenopathy [Clear to Auscultation] : lungs were clear to auscultation bilaterally [No Respiratory Distress] : no respiratory distress  [No Accessory Muscle Use] : no accessory muscle use [Regular Rhythm] : with a regular rhythm [Normal Rate] : normal rate  [No Murmur] : no murmur heard [No Carotid Bruits] : no carotid bruits [Normal S1, S2] : normal S1 and S2 [Pedal Pulses Present] : the pedal pulses are present [No Varicosities] : no varicosities [No Abdominal Bruit] : a ~M bruit was not heard ~T in the abdomen [No Edema] : there was no peripheral edema [No Extremity Clubbing/Cyanosis] : no extremity clubbing/cyanosis [Non Tender] : non-tender [No Palpable Aorta] : no palpable aorta [Soft] : abdomen soft [No Masses] : no abdominal mass palpated [Non-distended] : non-distended [No HSM] : no HSM [Normal Posterior Cervical Nodes] : no posterior cervical lymphadenopathy [Normal Bowel Sounds] : normal bowel sounds [Normal Anterior Cervical Nodes] : no anterior cervical lymphadenopathy [No CVA Tenderness] : no CVA  tenderness [No Spinal Tenderness] : no spinal tenderness [No Joint Swelling] : no joint swelling [Grossly Normal Strength/Tone] : grossly normal strength/tone [No Rash] : no rash [Normal Gait] : normal gait [Coordination Grossly Intact] : coordination grossly intact [No Focal Deficits] : no focal deficits [Deep Tendon Reflexes (DTR)] : deep tendon reflexes were 2+ and symmetric [Normal Affect] : the affect was normal [Normal Insight/Judgement] : insight and judgment were intact

## 2020-02-19 NOTE — ASSESSMENT
[FreeTextEntry1] : -CPE: blood and UA today\par \par -HIV/HC: refused.\par \par -HTN: well control. Continue meds.\par On amlodipine,Losartan,  HCTZ, Metoprolol\par \par -DM: on metformin. HbA1c: 5.9%. \par On simvastatin\par \par -Mammo: 2019. BIRAD 2.referral\par \par -Colonoscopy: 2015. Done q 3 years due to polyps.\par Schedule appointment with GI, Dr ledy Murphy\par \par -Gyn: pt will schedule appointment w/ Gyn.\par \par -Bone scan: 2/2018: osteopenia/ referral.\par \par -Immunizations:up to date.\par \par -Seen by cardiology: will request report\par \par

## 2020-02-19 NOTE — COUNSELING
[Activity counseling provided] : activity [Weight management counseling provided] : Weight management [Healthy eating counseling provided] : healthy eating [Fall prevention counseling provided] : Fall prevention counseling provided [Behavioral health counseling provided] : Behavioral health counseling provided [Good understanding] : Patient has a good understanding of lifestyle changes and steps needed to achieve self management goal [None] : None

## 2020-02-26 LAB
25(OH)D3 SERPL-MCNC: 46.4 NG/ML
ALBUMIN SERPL ELPH-MCNC: 4.3 G/DL
ALP BLD-CCNC: 117 U/L
ALT SERPL-CCNC: 10 U/L
ANION GAP SERPL CALC-SCNC: 13 MMOL/L
APPEARANCE: ABNORMAL
AST SERPL-CCNC: 13 U/L
BASOPHILS # BLD AUTO: 0.04 K/UL
BASOPHILS NFR BLD AUTO: 0.8 %
BILIRUB SERPL-MCNC: 0.3 MG/DL
BILIRUBIN URINE: NEGATIVE
BLOOD URINE: NEGATIVE
BUN SERPL-MCNC: 17 MG/DL
CALCIUM SERPL-MCNC: 9.8 MG/DL
CHLORIDE SERPL-SCNC: 94 MMOL/L
CHOLEST SERPL-MCNC: 164 MG/DL
CHOLEST/HDLC SERPL: 2 RATIO
CO2 SERPL-SCNC: 24 MMOL/L
COLOR: NORMAL
CREAT SERPL-MCNC: 0.9 MG/DL
EOSINOPHIL # BLD AUTO: 0.28 K/UL
EOSINOPHIL NFR BLD AUTO: 5.5 %
GLUCOSE QUALITATIVE U: NEGATIVE
GLUCOSE SERPL-MCNC: 113 MG/DL
HCT VFR BLD CALC: 36.9 %
HDLC SERPL-MCNC: 81 MG/DL
HGB BLD-MCNC: 12 G/DL
IMM GRANULOCYTES NFR BLD AUTO: 0.2 %
KETONES URINE: NEGATIVE
LDLC SERPL CALC-MCNC: 74 MG/DL
LEUKOCYTE ESTERASE URINE: ABNORMAL
LYMPHOCYTES # BLD AUTO: 0.95 K/UL
LYMPHOCYTES NFR BLD AUTO: 18.8 %
MAN DIFF?: NORMAL
MCHC RBC-ENTMCNC: 29 PG
MCHC RBC-ENTMCNC: 32.5 GM/DL
MCV RBC AUTO: 89.1 FL
MONOCYTES # BLD AUTO: 0.41 K/UL
MONOCYTES NFR BLD AUTO: 8.1 %
NEUTROPHILS # BLD AUTO: 3.36 K/UL
NEUTROPHILS NFR BLD AUTO: 66.6 %
NITRITE URINE: NEGATIVE
PH URINE: 8
PLATELET # BLD AUTO: 288 K/UL
POTASSIUM SERPL-SCNC: 4.5 MMOL/L
PROT SERPL-MCNC: 7 G/DL
PROTEIN URINE: NEGATIVE
RBC # BLD: 4.14 M/UL
RBC # FLD: 12.7 %
SODIUM SERPL-SCNC: 131 MMOL/L
SPECIFIC GRAVITY URINE: 1.01
TRIGL SERPL-MCNC: 46 MG/DL
TSH SERPL-ACNC: 3.47 UIU/ML
UROBILINOGEN URINE: NORMAL
WBC # FLD AUTO: 5.05 K/UL

## 2020-02-26 RX ORDER — HYDROCHLOROTHIAZIDE 25 MG/1
25 TABLET ORAL
Qty: 90 | Refills: 1 | Status: DISCONTINUED | COMMUNITY
Start: 2017-09-18 | End: 2020-02-26

## 2020-06-05 ENCOUNTER — APPOINTMENT (OUTPATIENT)
Dept: GASTROENTEROLOGY | Facility: CLINIC | Age: 70
End: 2020-06-05
Payer: MEDICARE

## 2020-06-05 DIAGNOSIS — Z87.19 PERSONAL HISTORY OF OTHER DISEASES OF THE DIGESTIVE SYSTEM: ICD-10-CM

## 2020-06-05 PROCEDURE — 99204 OFFICE O/P NEW MOD 45 MIN: CPT

## 2020-06-05 NOTE — PHYSICAL EXAM
[General Appearance - In No Acute Distress] : in no acute distress [General Appearance - Alert] : alert [General Appearance - Well Developed] : well developed [General Appearance - Well Nourished] : well nourished [Outer Ear] : the ears and nose were normal in appearance [Sclera] : the sclera and conjunctiva were normal [Both Tympanic Membranes Were Examined] : both tympanic membranes were normal [Hearing Threshold Finger Rub Not [place]] : hearing was normal [Neck Cervical Mass (___cm)] : no neck mass was observed [Neck Appearance] : the appearance of the neck was normal [Auscultation Breath Sounds / Voice Sounds] : lungs were clear to auscultation bilaterally [Exaggerated Use Of Accessory Muscles For Inspiration] : no accessory muscle use [Respiration, Rhythm And Depth] : normal respiratory rhythm and effort [] : no respiratory distress [Heart Rate And Rhythm] : heart rate was normal and rhythm regular [Apical Impulse] : the apical impulse was normal [Heart Sounds] : normal S1 and S2 [Bowel Sounds] : normal bowel sounds [Normal Sphincter Tone] : normal sphincter tone [Abdomen Tenderness] : non-tender [Abdomen Soft] : soft [No Rectal Mass] : no rectal mass [External Hemorrhoid] : no external hemorrhoids [Internal Hemorrhoid] : no internal hemorrhoids [Occult Blood Positive] : stool was negative for occult blood [Femoral Lymph Nodes Enlarged Bilaterally] : femoral [Skin Color & Pigmentation] : normal skin color and pigmentation [Impaired Insight] : insight and judgment were intact [Oriented To Time, Place, And Person] : oriented to person, place, and time [Affect] : the affect was normal

## 2020-06-05 NOTE — REASON FOR VISIT
[Initial Evaluation] : an initial evaluation [FreeTextEntry1] : flatulance, hx of colon polyps, fatty liver

## 2020-06-05 NOTE — HISTORY OF PRESENT ILLNESS
[de-identified] : The patient is here for evaluation of fatty liver, and flatulence, history of colon polyps\par      The patient's main complaint is flatulence. She finds that for the past one year she has frequent severe flatulence. She is on metformin for diabetes. The past one day she gets soft stools. She denies overt diarrhea. She states that she gets these soft frequent stools intermittently a few times a month. Her last one day. Is not in distress. It does seem to be worse with onions. In general she avoids. She is able to eat but with no issues. She has no rectal bleeding. No constipation. No change in bowel habits. No family history of colon cancer colon polyps. The patient states she had colon polyps 5 years ago.\par     She was told many years ago but she had a fatty liver. End then. Her liver function tests were normal and hemoglobin was 12.

## 2020-06-05 NOTE — ASSESSMENT
[FreeTextEntry1] : A/P\par flatulance\par - will try FODMAp diet, GAS X\par if continues will send her for lactose tolerance test\par \par hx of colon polyps\par  I discussed the risks and benefits of colonoscopy and patient was given opportunity to ask questions. Colonoscopy to r/o colon cancer, polyps, AVM's. Patient is medically optimized for the procedure\par miralax prep\par \par fatty liver by history. normal LFT- will get U/S of liver

## 2020-06-22 ENCOUNTER — RX RENEWAL (OUTPATIENT)
Age: 70
End: 2020-06-22

## 2020-07-28 ENCOUNTER — APPOINTMENT (OUTPATIENT)
Dept: ULTRASOUND IMAGING | Facility: CLINIC | Age: 70
End: 2020-07-28
Payer: MEDICARE

## 2020-07-28 ENCOUNTER — OUTPATIENT (OUTPATIENT)
Dept: OUTPATIENT SERVICES | Facility: HOSPITAL | Age: 70
LOS: 1 days | End: 2020-07-28
Payer: MEDICARE

## 2020-07-28 DIAGNOSIS — Z00.00 ENCOUNTER FOR GENERAL ADULT MEDICAL EXAMINATION WITHOUT ABNORMAL FINDINGS: ICD-10-CM

## 2020-07-28 DIAGNOSIS — Z86.010 PERSONAL HISTORY OF COLONIC POLYPS: ICD-10-CM

## 2020-07-28 DIAGNOSIS — Z87.19 PERSONAL HISTORY OF OTHER DISEASES OF THE DIGESTIVE SYSTEM: ICD-10-CM

## 2020-07-28 PROCEDURE — 76700 US EXAM ABDOM COMPLETE: CPT | Mod: 26

## 2020-07-28 PROCEDURE — 76700 US EXAM ABDOM COMPLETE: CPT

## 2020-08-03 ENCOUNTER — RX RENEWAL (OUTPATIENT)
Age: 70
End: 2020-08-03

## 2020-08-04 ENCOUNTER — TRANSCRIPTION ENCOUNTER (OUTPATIENT)
Age: 70
End: 2020-08-04

## 2020-08-07 ENCOUNTER — APPOINTMENT (OUTPATIENT)
Dept: FAMILY MEDICINE | Facility: CLINIC | Age: 70
End: 2020-08-07
Payer: MEDICARE

## 2020-08-07 ENCOUNTER — RESULT CHARGE (OUTPATIENT)
Age: 70
End: 2020-08-07

## 2020-08-07 VITALS
HEART RATE: 59 BPM | BODY MASS INDEX: 31.18 KG/M2 | TEMPERATURE: 97.9 F | HEIGHT: 66 IN | OXYGEN SATURATION: 97 % | WEIGHT: 194 LBS | SYSTOLIC BLOOD PRESSURE: 136 MMHG | DIASTOLIC BLOOD PRESSURE: 81 MMHG

## 2020-08-07 LAB — HBA1C MFR BLD HPLC: 5.7

## 2020-08-07 PROCEDURE — 83036 HEMOGLOBIN GLYCOSYLATED A1C: CPT | Mod: QW

## 2020-08-07 PROCEDURE — 99214 OFFICE O/P EST MOD 30 MIN: CPT | Mod: 25

## 2020-08-07 PROCEDURE — 36415 COLL VENOUS BLD VENIPUNCTURE: CPT

## 2020-08-07 RX ORDER — MECLIZINE HYDROCHLORIDE 25 MG/1
25 TABLET ORAL EVERY 6 HOURS
Qty: 30 | Refills: 0 | Status: DISCONTINUED | COMMUNITY
Start: 2020-01-08 | End: 2020-08-07

## 2020-08-07 RX ORDER — LOSARTAN POTASSIUM AND HYDROCHLOROTHIAZIDE 25; 100 MG/1; MG/1
100-25 TABLET ORAL
Qty: 90 | Refills: 1 | Status: DISCONTINUED | COMMUNITY
Start: 2017-03-15 | End: 2020-08-07

## 2020-08-07 NOTE — HISTORY OF PRESENT ILLNESS
[FreeTextEntry1] : DM f/up\par Meds refills [de-identified] : 69 y/o F, w/ hx DM on metformin well control, HTN on amlodipine, Losartan, Hctz, metoprolol, Dyslipidemia on simvastatin, obesity, presents today forf/up and meds refills\par \par Pt doing well. No complaints. \par Seen by Cardiology recently at [facility]. 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

## 2020-08-07 NOTE — PHYSICAL EXAM
[No Acute Distress] : no acute distress [Well Nourished] : well nourished [Well-Appearing] : well-appearing [Well Developed] : well developed [Normal Sclera/Conjunctiva] : normal sclera/conjunctiva [EOMI] : extraocular movements intact [PERRL] : pupils equal round and reactive to light [Normal Oropharynx] : the oropharynx was normal [Normal Outer Ear/Nose] : the outer ears and nose were normal in appearance [No JVD] : no jugular venous distention [No Lymphadenopathy] : no lymphadenopathy [Supple] : supple [Thyroid Normal, No Nodules] : the thyroid was normal and there were no nodules present [No Accessory Muscle Use] : no accessory muscle use [No Respiratory Distress] : no respiratory distress  [Clear to Auscultation] : lungs were clear to auscultation bilaterally [Normal Rate] : normal rate  [Normal S1, S2] : normal S1 and S2 [Regular Rhythm] : with a regular rhythm [No Murmur] : no murmur heard [No Carotid Bruits] : no carotid bruits [No Varicosities] : no varicosities [No Abdominal Bruit] : a ~M bruit was not heard ~T in the abdomen [Pedal Pulses Present] : the pedal pulses are present [No Edema] : there was no peripheral edema [No Palpable Aorta] : no palpable aorta [No Extremity Clubbing/Cyanosis] : no extremity clubbing/cyanosis [Soft] : abdomen soft [No Masses] : no abdominal mass palpated [Non-distended] : non-distended [Non Tender] : non-tender [No HSM] : no HSM [Normal Bowel Sounds] : normal bowel sounds [Normal Posterior Cervical Nodes] : no posterior cervical lymphadenopathy [Normal Anterior Cervical Nodes] : no anterior cervical lymphadenopathy [No CVA Tenderness] : no CVA  tenderness [Grossly Normal Strength/Tone] : grossly normal strength/tone [No Spinal Tenderness] : no spinal tenderness [No Joint Swelling] : no joint swelling [No Rash] : no rash [Coordination Grossly Intact] : coordination grossly intact [Normal Gait] : normal gait [No Focal Deficits] : no focal deficits [Normal Insight/Judgement] : insight and judgment were intact [Normal Affect] : the affect was normal

## 2020-08-07 NOTE — ASSESSMENT
[FreeTextEntry1] : -CPE: 02/20\par -HIV/HC: refused.\par \par -HTN: well control. Continue meds.\par On amlodipine,Losartan, HCTZ, Metoprolol\par \par -DM: \par Reduce metformin to 500mg bid.. HbA1c: 5.7%. \par On simvastatin\par \par -Mammo: 06/20 BIRAD 2.\par \par -Colonoscopy: 2015. Done q 3 years due to polyps.\par Schedule appointment with GI, Dr ledy Murphy\par \par -Gyn: pt will schedule appointment w/ Gyn.\par \par -Bone scan: 2/2018: osteopenia/ referral.\par \par -Immunizations:up to date.\par \par -Seen by cardiology: will request report\par \par Blood drawn today

## 2020-08-10 LAB
ALBUMIN SERPL ELPH-MCNC: 4.6 G/DL
ALP BLD-CCNC: 115 U/L
ALT SERPL-CCNC: 13 U/L
ANION GAP SERPL CALC-SCNC: 14 MMOL/L
AST SERPL-CCNC: 14 U/L
BILIRUB SERPL-MCNC: 0.4 MG/DL
BUN SERPL-MCNC: 13 MG/DL
CALCIUM SERPL-MCNC: 9.8 MG/DL
CHLORIDE SERPL-SCNC: 95 MMOL/L
CHOLEST SERPL-MCNC: 170 MG/DL
CHOLEST/HDLC SERPL: 2.1 RATIO
CO2 SERPL-SCNC: 23 MMOL/L
CREAT SERPL-MCNC: 0.76 MG/DL
GLUCOSE SERPL-MCNC: 102 MG/DL
HDLC SERPL-MCNC: 81 MG/DL
LDLC SERPL CALC-MCNC: 80 MG/DL
POTASSIUM SERPL-SCNC: 4.2 MMOL/L
PROT SERPL-MCNC: 7.4 G/DL
SARS-COV-2 IGG SERPL IA-ACNC: 0.09 INDEX
SARS-COV-2 IGG SERPL QL IA: NEGATIVE
SODIUM SERPL-SCNC: 133 MMOL/L
TRIGL SERPL-MCNC: 47 MG/DL

## 2020-08-16 ENCOUNTER — APPOINTMENT (OUTPATIENT)
Dept: DISASTER EMERGENCY | Facility: CLINIC | Age: 70
End: 2020-08-16

## 2020-08-17 LAB — SARS-COV-2 N GENE NPH QL NAA+PROBE: NOT DETECTED

## 2020-08-19 ENCOUNTER — APPOINTMENT (OUTPATIENT)
Dept: GASTROENTEROLOGY | Facility: GI CENTER | Age: 70
End: 2020-08-19
Payer: MEDICARE

## 2020-08-19 ENCOUNTER — OUTPATIENT (OUTPATIENT)
Dept: OUTPATIENT SERVICES | Facility: HOSPITAL | Age: 70
LOS: 1 days | End: 2020-08-19
Payer: MEDICARE

## 2020-08-19 DIAGNOSIS — Z86.010 PERSONAL HISTORY OF COLONIC POLYPS: ICD-10-CM

## 2020-08-19 LAB — GLUCOSE BLDC GLUCOMTR-MCNC: 125 MG/DL — HIGH (ref 70–99)

## 2020-08-19 PROCEDURE — 82962 GLUCOSE BLOOD TEST: CPT

## 2020-08-19 PROCEDURE — G0105: CPT

## 2020-08-19 PROCEDURE — 45378 DIAGNOSTIC COLONOSCOPY: CPT

## 2020-08-19 NOTE — PROCEDURE
[Hx of Colon Polyps] : history of colon polyps [Allergies Reviewed] : allergies reviewed. [Procedure Explained] : The procedure was explained [Benefits] : benefits [Risks] : Risks [Alternatives] : alternatives [Bleeding] : bleeding risk [Consent Obtained] : written consent was obtained prior to the procedure and is detailed in the patient's record [Infection] : risk of infection [Automated Blood Pressure Cuff] : automated blood pressure cuff [Approved Diet Followed] : the patient avoided solid foods and adhered to the approved diet list for 24 hours prior to the procedure [Bowel Prep Kit] : the patient took the appropriate bowel preparation kit as directed [Patient] : the patient [Cardiac Monitor] : cardiac monitor [3] : 3 [Prep Qualtiy: ___] : Prep Quality:  [unfilled] [Withdrawal Time: ___] : Withdrawal Time:  [unfilled] [Performed By: ___] : Performed by:  TEAGAN [Abnormal Rectum] : a normal rectum [External Hemorrhoids] : no external hemorrhoids [Left Lateral Decubitus] : The patient was positioned in the left lateral decubitus position [Insufflated] : insufflated [Single Pass Needed] : after a single pass [Cecum (Landmarks/Transillum)] : and guided to the cecum which was identified by the anatomic landmarks of the appendiceal orifice and ileocecal valve and by transillumination in the right lower quadrant [Patient Rotated Into Alternating Positions] : the patient was not rotated [Retroflex View] : a retroflex view of the rectum was performed [Normal] : Normal [Hemorrhoids] : hemorrhoids [Diverticulosis] : diverticulosis [No Complications] : There were no complications [Vital Signs Stable] : the vital signs were stable

## 2020-08-19 NOTE — PROCEDURE
[Hx of Colon Polyps] : history of colon polyps [Procedure Explained] : The procedure was explained [Allergies Reviewed] : allergies reviewed. [Risks] : Risks [Benefits] : benefits [Bleeding] : bleeding risk [Alternatives] : alternatives [Infection] : risk of infection [Consent Obtained] : written consent was obtained prior to the procedure and is detailed in the patient's record [Bowel Prep Kit] : the patient took the appropriate bowel preparation kit as directed [Automated Blood Pressure Cuff] : automated blood pressure cuff [Approved Diet Followed] : the patient avoided solid foods and adhered to the approved diet list for 24 hours prior to the procedure [Patient] : the patient [Cardiac Monitor] : cardiac monitor [Prep Qualtiy: ___] : Prep Quality:  [unfilled] [3] : 3 [Withdrawal Time: ___] : Withdrawal Time:  [unfilled] [Performed By: ___] : Performed by:  TEAGAN [Left Lateral Decubitus] : The patient was positioned in the left lateral decubitus position [Abnormal Rectum] : a normal rectum [External Hemorrhoids] : no external hemorrhoids [Cecum (Landmarks/Transillum)] : and guided to the cecum which was identified by the anatomic landmarks of the appendiceal orifice and ileocecal valve and by transillumination in the right lower quadrant [Insufflated] : insufflated [Single Pass Needed] : after a single pass [Patient Rotated Into Alternating Positions] : the patient was not rotated [Retroflex View] : a retroflex view of the rectum was performed [Normal] : Normal [Hemorrhoids] : hemorrhoids [Diverticulosis] : diverticulosis [No Complications] : There were no complications [Vital Signs Stable] : the vital signs were stable

## 2020-08-19 NOTE — REASON FOR VISIT
[Follow-Up: _____] : a [unfilled] follow-up visit [FreeTextEntry1] : hx of colon polyps [Colonoscopy] : a colonoscopy [FreeTextEntry2] : hx colon polyps

## 2020-08-19 NOTE — PHYSICAL EXAM
[General Appearance - Alert] : alert [General Appearance - In No Acute Distress] : in no acute distress [General Appearance - Well Nourished] : well nourished [General Appearance - Well Developed] : well developed [Sclera] : the sclera and conjunctiva were normal [Outer Ear] : the ears and nose were normal in appearance [Both Tympanic Membranes Were Examined] : both tympanic membranes were normal [Hearing Threshold Finger Rub Not [place]] : hearing was normal [Neck Appearance] : the appearance of the neck was normal [Auscultation Breath Sounds / Voice Sounds] : lungs were clear to auscultation bilaterally [] : no respiratory distress [Respiration, Rhythm And Depth] : normal respiratory rhythm and effort [Exaggerated Use Of Accessory Muscles For Inspiration] : no accessory muscle use [Apical Impulse] : the apical impulse was normal [Heart Rate And Rhythm] : heart rate was normal and rhythm regular [Heart Sounds] : normal S1 and S2 [Bowel Sounds] : normal bowel sounds [Abdomen Tenderness] : non-tender [Abdomen Soft] : soft [Skin Color & Pigmentation] : normal skin color and pigmentation [Oriented To Time, Place, And Person] : oriented to person, place, and time [Impaired Insight] : insight and judgment were intact [Affect] : the affect was normal

## 2020-08-19 NOTE — ASSESSMENT
[FreeTextEntry1] : A/P\par Hx of colon polyps\par Diverticulosis, hemorrhoids\par no polyps\par routine colonoscopy  stops at age 74. NO further routine colonoscopies

## 2020-08-19 NOTE — PHYSICAL EXAM
[General Appearance - Alert] : alert [General Appearance - Well Developed] : well developed [General Appearance - Well Nourished] : well nourished [General Appearance - In No Acute Distress] : in no acute distress [Outer Ear] : the ears and nose were normal in appearance [Sclera] : the sclera and conjunctiva were normal [Both Tympanic Membranes Were Examined] : both tympanic membranes were normal [Hearing Threshold Finger Rub Not [place]] : hearing was normal [Neck Appearance] : the appearance of the neck was normal [Auscultation Breath Sounds / Voice Sounds] : lungs were clear to auscultation bilaterally [Exaggerated Use Of Accessory Muscles For Inspiration] : no accessory muscle use [Respiration, Rhythm And Depth] : normal respiratory rhythm and effort [] : no respiratory distress [Heart Rate And Rhythm] : heart rate was normal and rhythm regular [Heart Sounds] : normal S1 and S2 [Apical Impulse] : the apical impulse was normal [Bowel Sounds] : normal bowel sounds [Abdomen Tenderness] : non-tender [Abdomen Soft] : soft [Oriented To Time, Place, And Person] : oriented to person, place, and time [Skin Color & Pigmentation] : normal skin color and pigmentation [Impaired Insight] : insight and judgment were intact [Affect] : the affect was normal

## 2020-08-24 ENCOUNTER — RX RENEWAL (OUTPATIENT)
Age: 70
End: 2020-08-24

## 2020-09-01 ENCOUNTER — RX RENEWAL (OUTPATIENT)
Age: 70
End: 2020-09-01

## 2020-10-14 ENCOUNTER — RECORD ABSTRACTING (OUTPATIENT)
Age: 70
End: 2020-10-14

## 2020-10-14 DIAGNOSIS — G47.33 OBSTRUCTIVE SLEEP APNEA (ADULT) (PEDIATRIC): ICD-10-CM

## 2020-10-14 DIAGNOSIS — Z87.898 PERSONAL HISTORY OF OTHER SPECIFIED CONDITIONS: ICD-10-CM

## 2020-10-14 DIAGNOSIS — Z87.09 PERSONAL HISTORY OF OTHER DISEASES OF THE RESPIRATORY SYSTEM: ICD-10-CM

## 2020-10-14 DIAGNOSIS — H83.09 LABYRINTHITIS, UNSPECIFIED EAR: ICD-10-CM

## 2020-10-15 ENCOUNTER — APPOINTMENT (OUTPATIENT)
Dept: OTOLARYNGOLOGY | Facility: CLINIC | Age: 70
End: 2020-10-15
Payer: MEDICARE

## 2020-10-15 VITALS
WEIGHT: 194 LBS | HEIGHT: 66 IN | TEMPERATURE: 97.2 F | SYSTOLIC BLOOD PRESSURE: 152 MMHG | BODY MASS INDEX: 31.18 KG/M2 | HEART RATE: 54 BPM | DIASTOLIC BLOOD PRESSURE: 82 MMHG

## 2020-10-15 DIAGNOSIS — Z86.39 PERSONAL HISTORY OF OTHER ENDOCRINE, NUTRITIONAL AND METABOLIC DISEASE: ICD-10-CM

## 2020-10-15 PROCEDURE — 92570 ACOUSTIC IMMITANCE TESTING: CPT

## 2020-10-15 PROCEDURE — 92557 COMPREHENSIVE HEARING TEST: CPT

## 2020-10-15 PROCEDURE — 99214 OFFICE O/P EST MOD 30 MIN: CPT

## 2020-10-15 NOTE — ASSESSMENT
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

## 2020-10-15 NOTE — REVIEW OF SYSTEMS
[Seasonal Allergies] : seasonal allergies [Dizziness] : dizziness [Hearing Loss] : hearing loss [Post Nasal Drip] : post nasal drip [Lightheadedness] : lightheadedness [Vertigo] : vertigo [Recurrent Sinus Infections] : recurrent sinus infections [Nasal Congestion] : nasal congestion [Easy Bruising] : a tendency for easy bruising [Joint Pain] : joint pain [Snoring With Pauses] : snoring with pauses [As Noted in HPI] : as noted in HPI

## 2020-10-15 NOTE — PHYSICAL EXAM
[Midline] : trachea located in midline position [Normal] : no nystagmus [] : Tandem Romberg test is negative [de-identified] : unable to perform, too off-balance

## 2020-10-15 NOTE — HISTORY OF PRESENT ILLNESS
[de-identified] : 70 yr old female\par +vertigo a few times (+hx vertigo in the past) occurs when she lies down and gets up from bed\par vertigo recurred again 1 week ago\par ABR,ECOG, VNG recommended to eval vertigo in 2018,but symptoms resolved.  Was recommended again 7/2020\par denies recent URI\par +childhood otitis\par -noise exp, head trauma, FH\par \par

## 2020-10-21 ENCOUNTER — APPOINTMENT (OUTPATIENT)
Dept: OTOLARYNGOLOGY | Facility: CLINIC | Age: 70
End: 2020-10-21
Payer: MEDICARE

## 2020-10-21 PROCEDURE — 92584 ELECTROCOCHLEOGRAPHY: CPT

## 2020-10-21 PROCEDURE — 92585: CPT

## 2020-10-21 PROCEDURE — 92540 BASIC VESTIBULAR EVALUATION: CPT

## 2020-10-21 PROCEDURE — 92537 CALORIC VSTBLR TEST W/REC: CPT

## 2020-10-29 ENCOUNTER — RX RENEWAL (OUTPATIENT)
Age: 70
End: 2020-10-29

## 2020-10-29 ENCOUNTER — APPOINTMENT (OUTPATIENT)
Dept: OTOLARYNGOLOGY | Facility: CLINIC | Age: 70
End: 2020-10-29
Payer: MEDICARE

## 2020-10-29 VITALS
HEART RATE: 54 BPM | TEMPERATURE: 97.2 F | DIASTOLIC BLOOD PRESSURE: 75 MMHG | SYSTOLIC BLOOD PRESSURE: 166 MMHG | WEIGHT: 194 LBS | HEIGHT: 66 IN | BODY MASS INDEX: 31.18 KG/M2

## 2020-10-29 DIAGNOSIS — H90.3 SENSORINEURAL HEARING LOSS, BILATERAL: ICD-10-CM

## 2020-10-29 PROCEDURE — 99213 OFFICE O/P EST LOW 20 MIN: CPT

## 2020-10-29 NOTE — REASON FOR VISIT
[Subsequent Evaluation] : a subsequent evaluation for [FreeTextEntry2] : follow up on Trifecta results and MRI

## 2020-10-29 NOTE — REVIEW OF SYSTEMS
[Seasonal Allergies] : seasonal allergies [Post Nasal Drip] : post nasal drip [As Noted in HPI] : as noted in HPI [Hearing Loss] : hearing loss [Dizziness] : dizziness [Vertigo] : vertigo [Lightheadedness] : lightheadedness [Nasal Congestion] : nasal congestion [Recurrent Sinus Infections] : recurrent sinus infections [Snoring With Pauses] : snoring with pauses [Joint Pain] : joint pain [Easy Bruising] : a tendency for easy bruising

## 2020-10-29 NOTE — HISTORY OF PRESENT ILLNESS
[de-identified] : 70 yr old female w long hx of intermittent vertigo. ABR and ECOG WNL, VNG suggestive of central pathology.  MRI +mild ischemic disease. Didn't have relief w vestib rehab in the past.  Hasn't seen a neurologist in a long time

## 2020-11-25 ENCOUNTER — NON-APPOINTMENT (OUTPATIENT)
Age: 70
End: 2020-11-25

## 2020-11-25 ENCOUNTER — APPOINTMENT (OUTPATIENT)
Dept: FAMILY MEDICINE | Facility: CLINIC | Age: 70
End: 2020-11-25
Payer: MEDICARE

## 2020-11-25 VITALS
HEART RATE: 54 BPM | OXYGEN SATURATION: 98 % | BODY MASS INDEX: 32.47 KG/M2 | DIASTOLIC BLOOD PRESSURE: 82 MMHG | TEMPERATURE: 98.2 F | RESPIRATION RATE: 14 BRPM | WEIGHT: 202 LBS | HEIGHT: 66 IN | SYSTOLIC BLOOD PRESSURE: 136 MMHG

## 2020-11-25 PROCEDURE — 99214 OFFICE O/P EST MOD 30 MIN: CPT | Mod: 25

## 2020-11-25 PROCEDURE — 36415 COLL VENOUS BLD VENIPUNCTURE: CPT

## 2020-11-25 PROCEDURE — 93000 ELECTROCARDIOGRAM COMPLETE: CPT

## 2020-11-25 PROCEDURE — 83036 HEMOGLOBIN GLYCOSYLATED A1C: CPT | Mod: QW

## 2020-11-25 RX ORDER — ALPRAZOLAM 0.5 MG/1
0.5 TABLET ORAL
Qty: 2 | Refills: 0 | Status: DISCONTINUED | COMMUNITY
Start: 2020-10-15 | End: 2020-11-25

## 2020-11-25 RX ORDER — SIMVASTATIN 20 MG/1
20 TABLET, FILM COATED ORAL
Refills: 0 | Status: DISCONTINUED | COMMUNITY
End: 2020-11-25

## 2020-11-25 RX ORDER — METOPROLOL TARTRATE 50 MG/1
50 TABLET ORAL
Refills: 0 | Status: DISCONTINUED | COMMUNITY
End: 2020-11-25

## 2020-11-25 RX ORDER — LOSARTAN POTASSIUM 100 MG/1
100 TABLET, FILM COATED ORAL DAILY
Qty: 90 | Refills: 1 | Status: DISCONTINUED | COMMUNITY
Start: 2020-01-02 | End: 2020-11-25

## 2020-11-27 LAB
ANION GAP SERPL CALC-SCNC: 13 MMOL/L
BASOPHILS # BLD AUTO: 0.06 K/UL
BASOPHILS NFR BLD AUTO: 0.8 %
BUN SERPL-MCNC: 16 MG/DL
CALCIUM SERPL-MCNC: 10.2 MG/DL
CHLORIDE SERPL-SCNC: 94 MMOL/L
CO2 SERPL-SCNC: 25 MMOL/L
CREAT SERPL-MCNC: 0.75 MG/DL
EOSINOPHIL # BLD AUTO: 0.56 K/UL
EOSINOPHIL NFR BLD AUTO: 7.7 %
GLUCOSE SERPL-MCNC: 82 MG/DL
HCT VFR BLD CALC: 38.3 %
HGB BLD-MCNC: 12.3 G/DL
IMM GRANULOCYTES NFR BLD AUTO: 0.1 %
INR PPP: 1.01 RATIO
LYMPHOCYTES # BLD AUTO: 1.31 K/UL
LYMPHOCYTES NFR BLD AUTO: 18 %
MAN DIFF?: NORMAL
MCHC RBC-ENTMCNC: 29.6 PG
MCHC RBC-ENTMCNC: 32.1 GM/DL
MCV RBC AUTO: 92.1 FL
MONOCYTES # BLD AUTO: 0.66 K/UL
MONOCYTES NFR BLD AUTO: 9.1 %
NEUTROPHILS # BLD AUTO: 4.67 K/UL
NEUTROPHILS NFR BLD AUTO: 64.3 %
PLATELET # BLD AUTO: 256 K/UL
POTASSIUM SERPL-SCNC: 4.4 MMOL/L
PT BLD: 11.9 SEC
RBC # BLD: 4.16 M/UL
RBC # FLD: 13 %
SODIUM SERPL-SCNC: 133 MMOL/L
WBC # FLD AUTO: 7.27 K/UL

## 2020-11-27 NOTE — ASSESSMENT
[High Risk Surgery - Intraperitoneal, Intrathoracic or Supringuinal Vascular Procedures] : High Risk Surgery - Intraperitoneal, Intrathoracic or Supringuinal Vascular Procedures - No (0) [Ischemic Heart Disease] : Ischemic Heart Disease - No (0) [Congestive Heart Failure] : Congestive Heart Failure - No (0) [Prior Cerebrovascular Accident or TIA] : Prior Cerebrovascular Accident or TIA - No (0) [Creatinine >= 2mg/dL (1 Point)] : Creatinine >= 2mg/dL - No (0) [Insulin-dependent Diabetic (1 Point)] : Insulin-dependent Diabetic - No (0) [0] : 0 , RCRI Class: I, Risk of Post-Op Cardiac Complications: 3.9%, 95% CI for Risk Estimate: 2.8% - 5.4% [No Further Testing Recommended] : no further testing recommended [Continue medications as is] : Continue current medications [As per surgery] : as per surgery [FreeTextEntry4] : Fax pre op to: [phone]\par CoVid PCR 12/7/20

## 2020-11-27 NOTE — PLAN
[FreeTextEntry1] : Lab reviewed. Pt with no absolute contraindication for surgical procedure. Clear from clinical stand point.

## 2020-11-27 NOTE — HISTORY OF PRESENT ILLNESS
[No Pertinent Cardiac History] : no history of aortic stenosis, atrial fibrillation, coronary artery disease, recent myocardial infarction, or implantable device/pacemaker [No Pertinent Pulmonary History] : no history of asthma, COPD, sleep apnea, or smoking [No Adverse Anesthesia Reaction] : no adverse anesthesia reaction in self or family member [Diabetes] : diabetes [(Patient denies any chest pain, claudication, dyspnea on exertion, orthopnea, palpitations or syncope)] : Patient denies any chest pain, claudication, dyspnea on exertion, orthopnea, palpitations or syncope [Chronic Anticoagulation] : no chronic anticoagulation [Chronic Kidney Disease] : no chronic kidney disease [FreeTextEntry1] : TRigger finger RT index [FreeTextEntry2] : 12/09/20 [FreeTextEntry3] :  Dr Huber Gonzalez [FreeTextEntry4] : 69 y/o F, w/ hx DM on metformin well control, HTN on amlodipine, Losartan, Hctz, metoprolol, Dyslipidemia on simvastatin, obesity, presents today for medical clearance for RT middle finger trigger tendon repair.\par \par Pt doing well. No complaints. \par Seen by Cardiology at [facility]. 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

## 2020-11-30 ENCOUNTER — RX RENEWAL (OUTPATIENT)
Age: 70
End: 2020-11-30

## 2020-12-11 ENCOUNTER — APPOINTMENT (OUTPATIENT)
Dept: FAMILY MEDICINE | Facility: CLINIC | Age: 70
End: 2020-12-11

## 2021-02-24 ENCOUNTER — APPOINTMENT (OUTPATIENT)
Dept: FAMILY MEDICINE | Facility: CLINIC | Age: 71
End: 2021-02-24
Payer: MEDICARE

## 2021-02-24 VITALS
WEIGHT: 204 LBS | HEIGHT: 66 IN | TEMPERATURE: 97 F | OXYGEN SATURATION: 98 % | RESPIRATION RATE: 16 BRPM | DIASTOLIC BLOOD PRESSURE: 68 MMHG | BODY MASS INDEX: 32.78 KG/M2 | HEART RATE: 56 BPM | SYSTOLIC BLOOD PRESSURE: 132 MMHG

## 2021-02-24 DIAGNOSIS — Z87.898 PERSONAL HISTORY OF OTHER SPECIFIED CONDITIONS: ICD-10-CM

## 2021-02-24 PROCEDURE — 99214 OFFICE O/P EST MOD 30 MIN: CPT | Mod: 25

## 2021-02-24 PROCEDURE — 83036 HEMOGLOBIN GLYCOSYLATED A1C: CPT | Mod: QW

## 2021-02-24 NOTE — HISTORY OF PRESENT ILLNESS
[FreeTextEntry1] : DM f/up\par Meds refills [de-identified] : 71 y/o F, w/ hx DM on metformin well control, HTN on amlodipine, Losartan, Hctz, metoprolol, Dyslipidemia on simvastatin, obesity, presents today forf/up and meds refills\par Long hx vertigo. Attempted vestibular therapy, not complete due to Covid cancellations.\par Pt doing well. No complaints. \par Seen by Cardiology recently at [facility]. 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

## 2021-02-24 NOTE — HISTORY OF PRESENT ILLNESS
[FreeTextEntry1] : DM f/up\par Meds refills [de-identified] : 69 y/o F, w/ hx DM on metformin well control, HTN on amlodipine, Losartan, Hctz, metoprolol, Dyslipidemia on simvastatin, obesity, presents today forf/up and meds refills\par Long hx vertigo. Attempted vestibular therapy, not complete due to Covid cancellations.\par Pt doing well. No complaints. \par Seen by Cardiology recently at [facility]. 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

## 2021-02-24 NOTE — PHYSICAL EXAM
[Well Nourished] : well nourished [Well Developed] : well developed [Well-Appearing] : well-appearing [Normal Sclera/Conjunctiva] : normal sclera/conjunctiva [PERRL] : pupils equal round and reactive to light [EOMI] : extraocular movements intact [Normal Outer Ear/Nose] : the outer ears and nose were normal in appearance [Normal Oropharynx] : the oropharynx was normal [No JVD] : no jugular venous distention [No Lymphadenopathy] : no lymphadenopathy [Supple] : supple [Thyroid Normal, No Nodules] : the thyroid was normal and there were no nodules present [No Respiratory Distress] : no respiratory distress  [No Accessory Muscle Use] : no accessory muscle use [Clear to Auscultation] : lungs were clear to auscultation bilaterally [Normal Rate] : normal rate  [Regular Rhythm] : with a regular rhythm [Normal S1, S2] : normal S1 and S2 [No Murmur] : no murmur heard [No Carotid Bruits] : no carotid bruits [No Abdominal Bruit] : a ~M bruit was not heard ~T in the abdomen [No Varicosities] : no varicosities [Pedal Pulses Present] : the pedal pulses are present [No Edema] : there was no peripheral edema [No Palpable Aorta] : no palpable aorta [No Extremity Clubbing/Cyanosis] : no extremity clubbing/cyanosis [Soft] : abdomen soft [Non Tender] : non-tender [Non-distended] : non-distended [No Masses] : no abdominal mass palpated [No HSM] : no HSM [Normal Bowel Sounds] : normal bowel sounds [Normal Posterior Cervical Nodes] : no posterior cervical lymphadenopathy [Normal Anterior Cervical Nodes] : no anterior cervical lymphadenopathy [No CVA Tenderness] : no CVA  tenderness [No Spinal Tenderness] : no spinal tenderness [No Joint Swelling] : no joint swelling [Grossly Normal Strength/Tone] : grossly normal strength/tone [Coordination Grossly Intact] : coordination grossly intact [No Rash] : no rash [No Focal Deficits] : no focal deficits [Normal Gait] : normal gait [Normal Affect] : the affect was normal [Normal Insight/Judgement] : insight and judgment were intact

## 2021-02-24 NOTE — ASSESSMENT
[FreeTextEntry1] : -CPE: 02/20\par -HIV/HC: refused.\par \par -HTN: well control. Continue meds.\par On amlodipine,Losartan, HCTZ, Metoprolol\par \par -DM: \par Reduce metformin to 500mg bid.. HbA1c: 5.7%. \par On simvastatin\par \par -Mammo: 06/20 BIRAD 2.\par \par -Colonoscopy: 2015. Done q 3 years due to polyps.\par Schedule appointment with GI, Dr ledy Murphy\par \par -Gyn: pt will schedule appointment w/ Gyn.\par \par -Bone scan: 06/2020: osteopenia/ referral.\par \par -Immunizations:up to date.\par \par -Seen by cardiology: will request report\par \par

## 2021-03-01 ENCOUNTER — RX RENEWAL (OUTPATIENT)
Age: 71
End: 2021-03-01

## 2021-04-04 ENCOUNTER — RX RENEWAL (OUTPATIENT)
Age: 71
End: 2021-04-04

## 2021-06-02 ENCOUNTER — TRANSCRIPTION ENCOUNTER (OUTPATIENT)
Age: 71
End: 2021-06-02

## 2021-06-28 ENCOUNTER — RX RENEWAL (OUTPATIENT)
Age: 71
End: 2021-06-28

## 2021-06-28 ENCOUNTER — APPOINTMENT (OUTPATIENT)
Dept: FAMILY MEDICINE | Facility: CLINIC | Age: 71
End: 2021-06-28
Payer: MEDICARE

## 2021-06-28 ENCOUNTER — NON-APPOINTMENT (OUTPATIENT)
Age: 71
End: 2021-06-28

## 2021-06-28 VITALS
HEART RATE: 71 BPM | OXYGEN SATURATION: 98 % | HEIGHT: 66 IN | SYSTOLIC BLOOD PRESSURE: 136 MMHG | TEMPERATURE: 96.8 F | DIASTOLIC BLOOD PRESSURE: 80 MMHG | BODY MASS INDEX: 34.55 KG/M2 | WEIGHT: 215 LBS | RESPIRATION RATE: 16 BRPM

## 2021-06-28 DIAGNOSIS — Z01.818 ENCOUNTER FOR OTHER PREPROCEDURAL EXAMINATION: ICD-10-CM

## 2021-06-28 DIAGNOSIS — Z76.89 PERSONS ENCOUNTERING HEALTH SERVICES IN OTHER SPECIFIED CIRCUMSTANCES: ICD-10-CM

## 2021-06-28 DIAGNOSIS — Z87.898 PERSONAL HISTORY OF OTHER SPECIFIED CONDITIONS: ICD-10-CM

## 2021-06-28 DIAGNOSIS — Z12.11 ENCOUNTER FOR SCREENING FOR MALIGNANT NEOPLASM OF COLON: ICD-10-CM

## 2021-06-28 DIAGNOSIS — H81.10 BENIGN PAROXYSMAL VERTIGO, UNSPECIFIED EAR: ICD-10-CM

## 2021-06-28 DIAGNOSIS — Z92.29 PERSONAL HISTORY OF OTHER DRUG THERAPY: ICD-10-CM

## 2021-06-28 DIAGNOSIS — Z71.89 OTHER SPECIFIED COUNSELING: ICD-10-CM

## 2021-06-28 DIAGNOSIS — Z11.59 ENCOUNTER FOR SCREENING FOR OTHER VIRAL DISEASES: ICD-10-CM

## 2021-06-28 DIAGNOSIS — Z86.010 PERSONAL HISTORY OF COLONIC POLYPS: ICD-10-CM

## 2021-06-28 PROCEDURE — 99213 OFFICE O/P EST LOW 20 MIN: CPT | Mod: 25

## 2021-06-28 PROCEDURE — 93000 ELECTROCARDIOGRAM COMPLETE: CPT

## 2021-06-28 PROCEDURE — 83036 HEMOGLOBIN GLYCOSYLATED A1C: CPT | Mod: QW

## 2021-06-28 RX ORDER — METFORMIN HYDROCHLORIDE 1000 MG/1
1000 TABLET, COATED ORAL
Refills: 0 | Status: COMPLETED | COMMUNITY
End: 2021-06-28

## 2021-06-28 RX ORDER — ALPRAZOLAM 0.5 MG/1
0.5 TABLET ORAL
Qty: 2 | Refills: 0 | Status: COMPLETED | COMMUNITY
Start: 2020-10-15 | End: 2021-06-28

## 2021-06-28 NOTE — PHYSICAL EXAM
[Normal] : no joint swelling and grossly normal strength and tone [de-identified] : Chest pain non reproducible.

## 2021-06-28 NOTE — HISTORY OF PRESENT ILLNESS
[FreeTextEntry8] : 70 y/o female presenting c/o 3 week h/o chest discomfort rated 3/10 , mid chest, non radiating, squeeze/heavy sensation that may last 20 minutes. Pt denies any associated SOB, Palpitations, dizziness, discomfort is non radiating, no similar symptoms in the past. Pt made appointment to see Cardiology from [facility] next week.

## 2021-06-28 NOTE — ASSESSMENT
[FreeTextEntry1] : Pt with PMHx of HTN, T2DM, Colonic poyps, HLD, presents with chest discomfort\par \par CVS: h/o HTN, HLD, p/w chest discomfort\par -EKG in house with no acute changes\par -Pleuritic vs spasm\par -Follow up with Cardiology as scheduled\par -Request records from Cardiology (stress / echo)\par -BP under control\par -Continue Amlodipine 10 mg, ASA, HCTZ, Losartan/HCTZ, Metoprolol 25 mg, Simvastatin 10 mg.\par \par ENDO: DM2\par -A1c 6.4-->6.2 today\par -Continue Metformin 500 m,g bid\par \par HCM:\par -Mammogram referral \par -Ophthalmology referral for diabetic eye exam\par -PFIZER Covid vaccine 2/13/21, 3/6/21

## 2021-06-28 NOTE — REVIEW OF SYSTEMS
[Chest Pain] : chest pain [Negative] : Gastrointestinal [Dizziness] : no dizziness [Fainting] : no fainting [Confusion] : no confusion

## 2021-07-09 ENCOUNTER — TRANSCRIPTION ENCOUNTER (OUTPATIENT)
Age: 71
End: 2021-07-09

## 2021-07-21 ENCOUNTER — APPOINTMENT (OUTPATIENT)
Dept: FAMILY MEDICINE | Facility: CLINIC | Age: 71
End: 2021-07-21

## 2021-08-03 ENCOUNTER — APPOINTMENT (OUTPATIENT)
Dept: FAMILY MEDICINE | Facility: CLINIC | Age: 71
End: 2021-08-03
Payer: MEDICARE

## 2021-08-03 VITALS
OXYGEN SATURATION: 95 % | RESPIRATION RATE: 16 BRPM | DIASTOLIC BLOOD PRESSURE: 82 MMHG | HEIGHT: 66 IN | SYSTOLIC BLOOD PRESSURE: 142 MMHG | TEMPERATURE: 97.5 F | WEIGHT: 216 LBS | HEART RATE: 67 BPM | BODY MASS INDEX: 34.72 KG/M2

## 2021-08-03 DIAGNOSIS — R07.89 OTHER CHEST PAIN: ICD-10-CM

## 2021-08-03 DIAGNOSIS — M65.321 TRIGGER FINGER, RIGHT INDEX FINGER: ICD-10-CM

## 2021-08-03 PROCEDURE — 36415 COLL VENOUS BLD VENIPUNCTURE: CPT

## 2021-08-03 PROCEDURE — G0439: CPT

## 2021-08-03 NOTE — ASSESSMENT
[FreeTextEntry1] : Pt with PMHx of HTN, T2DM, Colonic polyps, HLD, presents for wellness visit.\par \par CVS: h/o HTN, HLD.\par -EKG no acute changes 6/21\par -Request records from Cardiology (stress / echo), reportedly normal\par -BP under control\par -Continue Amlodipine 10 mg, ASA, HCTZ, Losartan/HCTZ, Metoprolol 25 mg, Simvastatin 10 mg.\par \par ENDO: DM2\par -A1c 6.4-->6.2 \par -Continue Metformin 500 mg bid\par \par HCM:\par -Fasting labs in house today.\par -Mammogram 7/21 BI-RADS 2 \par -Bone density 06/20, osteopenia\par -Ophthalmology referral for diabetic eye exam given last visit\par -PFIZER Covid vaccine 2/13/21, 3/6/21\par -Colonoscopy 8/20 with Dr Cheryl uMrphy, no further Colonoscopies.

## 2021-08-03 NOTE — HISTORY OF PRESENT ILLNESS
[FreeTextEntry1] : Wellness visit. [de-identified] : Pt with PMHx of HTN, T2DM, Colonic polyps, HLD, presents for wellness visit. No acute complains

## 2021-08-03 NOTE — HEALTH RISK ASSESSMENT
[Good] : ~his/her~  mood as  good [No] : In the past 12 months have you used drugs other than those required for medical reasons? No [No falls in past year] : Patient reported no falls in the past year [No Retinopathy] : No retinopathy [Patient reported mammogram was normal] : Patient reported mammogram was normal [Patient reported bone density results were abnormal] : Patient reported bone density results were abnormal [Patient reported colonoscopy was abnormal] : Patient reported colonoscopy was abnormal [HIV test declined] : HIV test declined [Hepatitis C test declined] : Hepatitis C test declined [None] : None [Alone] : lives alone [Employed] : employed [# Of Children ___] : has [unfilled] children [Feels Safe at Home] : Feels safe at home [Fully functional (bathing, dressing, toileting, transferring, walking, feeding)] : Fully functional (bathing, dressing, toileting, transferring, walking, feeding) [Fully functional (using the telephone, shopping, preparing meals, housekeeping, doing laundry, using] : Fully functional and needs no help or supervision to perform IADLs (using the telephone, shopping, preparing meals, housekeeping, doing laundry, using transportation, managing medications and managing finances) [Smoke Detector] : smoke detector [Seat Belt] :  uses seat belt [0] : 2) Feeling down, depressed, or hopeless: Not at all (0) [With Patient/Caregiver] : , with patient/caregiver [Designated Healthcare Proxy] : Designated healthcare proxy [Name: ___] : Health Care Proxy's Name: [unfilled]  [Relationship: ___] : Relationship: [unfilled] [DNR] : DNR [DNI] : DNI [] : No [[id number]] : 0 [EyeExamDate] : 12/19 [Change in mental status noted] : No change in mental status noted [Language] : denies difficulty with language [Handling Complex Tasks] : denies difficulty handling complex tasks [Reports changes in hearing] : Reports no changes in hearing [Sexually Active] : not sexually active [Reports changes in vision] : Reports no changes in vision [Reports changes in dental health] : Reports no changes in dental health [MammogramDate] : 2021 [BoneDensityDate] : 06/20 [BoneDensityComments] : osteopenia [ColonoscopyDate] : 2020 [ColonoscopyComments] : Dr Murphy [FreeTextEntry2] : Per smitha at [facility] [AdvancecareDate] : 08/21 [FreeTextEntry4] : MOLST not completed.

## 2021-08-03 NOTE — COUNSELING
[Fall prevention counseling provided] : Fall prevention counseling provided [Behavioral health counseling provided] : Behavioral health counseling provided [None] : None [Good understanding] : Patient has a good understanding of lifestyle changes and steps needed to achieve self management goal [AUDIT-C Screening administered and reviewed] : AUDIT-C Screening administered and reviewed [Encouraged to increase physical activity] : Encouraged to increase physical activity [____ min/wk Activity] : [unfilled] min/wk activity

## 2021-08-25 ENCOUNTER — RX RENEWAL (OUTPATIENT)
Age: 71
End: 2021-08-25

## 2021-09-01 LAB
25(OH)D3 SERPL-MCNC: 38.9 NG/ML
ALBUMIN SERPL ELPH-MCNC: 4.5 G/DL
ALP BLD-CCNC: 151 U/L
ALT SERPL-CCNC: 10 U/L
ANION GAP SERPL CALC-SCNC: 13 MMOL/L
AST SERPL-CCNC: 15 U/L
BASOPHILS # BLD AUTO: 0.05 K/UL
BASOPHILS NFR BLD AUTO: 0.6 %
BILIRUB SERPL-MCNC: 0.5 MG/DL
BUN SERPL-MCNC: 13 MG/DL
CALCIUM SERPL-MCNC: 10.2 MG/DL
CHLORIDE SERPL-SCNC: 94 MMOL/L
CHOLEST SERPL-MCNC: 199 MG/DL
CO2 SERPL-SCNC: 24 MMOL/L
CREAT SERPL-MCNC: 0.8 MG/DL
EOSINOPHIL # BLD AUTO: 0.3 K/UL
EOSINOPHIL NFR BLD AUTO: 3.4 %
GLUCOSE SERPL-MCNC: 110 MG/DL
HCT VFR BLD CALC: 40.2 %
HDLC SERPL-MCNC: 90 MG/DL
HGB BLD-MCNC: 13.1 G/DL
IMM GRANULOCYTES NFR BLD AUTO: 0.2 %
LDLC SERPL CALC-MCNC: 93 MG/DL
LYMPHOCYTES # BLD AUTO: 1.23 K/UL
LYMPHOCYTES NFR BLD AUTO: 14 %
MAN DIFF?: NORMAL
MCHC RBC-ENTMCNC: 29 PG
MCHC RBC-ENTMCNC: 32.6 GM/DL
MCV RBC AUTO: 88.9 FL
MONOCYTES # BLD AUTO: 0.62 K/UL
MONOCYTES NFR BLD AUTO: 7.1 %
NEUTROPHILS # BLD AUTO: 6.54 K/UL
NEUTROPHILS NFR BLD AUTO: 74.7 %
NONHDLC SERPL-MCNC: 109 MG/DL
PLATELET # BLD AUTO: 269 K/UL
POTASSIUM SERPL-SCNC: 4.3 MMOL/L
PROT SERPL-MCNC: 7.6 G/DL
RBC # BLD: 4.52 M/UL
RBC # FLD: 13.2 %
SODIUM SERPL-SCNC: 131 MMOL/L
TRIGL SERPL-MCNC: 80 MG/DL
TSH SERPL-ACNC: 2.7 UIU/ML
WBC # FLD AUTO: 8.76 K/UL

## 2021-09-08 ENCOUNTER — RX RENEWAL (OUTPATIENT)
Age: 71
End: 2021-09-08

## 2021-10-01 ENCOUNTER — TRANSCRIPTION ENCOUNTER (OUTPATIENT)
Age: 71
End: 2021-10-01

## 2021-10-05 ENCOUNTER — RX RENEWAL (OUTPATIENT)
Age: 71
End: 2021-10-05

## 2021-10-06 ENCOUNTER — TRANSCRIPTION ENCOUNTER (OUTPATIENT)
Age: 71
End: 2021-10-06

## 2021-11-24 ENCOUNTER — RX RENEWAL (OUTPATIENT)
Age: 71
End: 2021-11-24

## 2021-11-29 ENCOUNTER — TRANSCRIPTION ENCOUNTER (OUTPATIENT)
Age: 71
End: 2021-11-29

## 2021-12-22 ENCOUNTER — APPOINTMENT (OUTPATIENT)
Dept: FAMILY MEDICINE | Facility: CLINIC | Age: 71
End: 2021-12-22

## 2021-12-27 ENCOUNTER — APPOINTMENT (OUTPATIENT)
Dept: FAMILY MEDICINE | Facility: CLINIC | Age: 71
End: 2021-12-27
Payer: MEDICARE

## 2021-12-27 VITALS
WEIGHT: 217 LBS | HEART RATE: 66 BPM | TEMPERATURE: 97.8 F | DIASTOLIC BLOOD PRESSURE: 70 MMHG | OXYGEN SATURATION: 96 % | HEIGHT: 66 IN | SYSTOLIC BLOOD PRESSURE: 140 MMHG | BODY MASS INDEX: 34.87 KG/M2 | RESPIRATION RATE: 16 BRPM

## 2021-12-27 LAB — HBA1C MFR BLD HPLC: 7.2

## 2021-12-27 PROCEDURE — 36415 COLL VENOUS BLD VENIPUNCTURE: CPT

## 2021-12-27 PROCEDURE — 99214 OFFICE O/P EST MOD 30 MIN: CPT | Mod: 25

## 2021-12-27 PROCEDURE — 83036 HEMOGLOBIN GLYCOSYLATED A1C: CPT | Mod: QW

## 2021-12-27 NOTE — HEALTH RISK ASSESSMENT
[No] : In the past 12 months have you used drugs other than those required for medical reasons? No [No falls in past year] : Patient reported no falls in the past year [Never] : Never [de-identified] : cardiology

## 2021-12-27 NOTE — HISTORY OF PRESENT ILLNESS
[FreeTextEntry1] : DM f/up\par Meds refills [de-identified] : 72 y/o F, w/ hx DM on metformin well control, HTN on amlodipine, Losartan, Hctz, metoprolol, Dyslipidemia on simvastatin, obesity, presents today forf/up and meds refills\par Long hx vertigo. Attempted vestibular therapy, not complete due to Covid cancellations.\par Pt doing well. No complaints. \par Seen by Cardiology recently at [facility]. 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

## 2021-12-27 NOTE — ASSESSMENT
[FreeTextEntry1] : -CPE: 08/2021\par -HIV/HC: refused.\par \par -HTN: well control. Continue meds.\par On amlodipine,Losartan, HCTZ, Metoprolol\par \par -DM: \par on metformin to 500mg bid.. HbA1c: 7.2%. \par On simvastatin\par \par -Mammo: 07/21 BIRAD 2.\par \par -Colonoscopy: 2015. Done q 3 years due to polyps.\par Schedule appointment with GI, Dr ledy Murphy\par \par -Gyn: pt will schedule appointment w/ Gyn.\par \par -Bone scan: 06/2020: osteopenia/ referral.\par \par -Immunizations:up to date.\par \par -Seen by cardiology: \par Elevated PAL:\par Blood drawn today\par \par

## 2022-01-05 LAB
ALBUMIN SERPL ELPH-MCNC: 4.5 G/DL
ALP BLD-CCNC: 159 IU/L
ALP BLD-CCNC: 162 U/L
ALP BONE CFR SERPL: 28 %
ALP INTEST CFR SERPL: 5 %
ALP LIVER CFR SERPL: 67 %
ALT SERPL-CCNC: 16 U/L
ANION GAP SERPL CALC-SCNC: 15 MMOL/L
AST SERPL-CCNC: 15 U/L
BILIRUB SERPL-MCNC: 0.4 MG/DL
BUN SERPL-MCNC: 20 MG/DL
CALCIUM SERPL-MCNC: 10.1 MG/DL
CHLORIDE SERPL-SCNC: 95 MMOL/L
CO2 SERPL-SCNC: 23 MMOL/L
CREAT SERPL-MCNC: 0.99 MG/DL
GLUCOSE SERPL-MCNC: 132 MG/DL
POTASSIUM SERPL-SCNC: 4.5 MMOL/L
PROT SERPL-MCNC: 7.7 G/DL
SARS-COV-2 N GENE NPH QL NAA+PROBE: NOT DETECTED
SODIUM SERPL-SCNC: 134 MMOL/L

## 2022-01-27 ENCOUNTER — RX RENEWAL (OUTPATIENT)
Age: 72
End: 2022-01-27

## 2022-02-20 ENCOUNTER — RX RENEWAL (OUTPATIENT)
Age: 72
End: 2022-02-20

## 2022-03-04 ENCOUNTER — RX RENEWAL (OUTPATIENT)
Age: 72
End: 2022-03-04

## 2022-03-28 ENCOUNTER — RX RENEWAL (OUTPATIENT)
Age: 72
End: 2022-03-28

## 2022-04-11 PROBLEM — Z11.59 SCREENING FOR VIRAL DISEASE: Status: RESOLVED | Noted: 2020-08-07 | Resolved: 2021-06-28

## 2022-05-18 ENCOUNTER — APPOINTMENT (OUTPATIENT)
Dept: FAMILY MEDICINE | Facility: CLINIC | Age: 72
End: 2022-05-18
Payer: MEDICARE

## 2022-05-18 VITALS
TEMPERATURE: 98 F | WEIGHT: 211 LBS | HEIGHT: 66 IN | RESPIRATION RATE: 16 BRPM | BODY MASS INDEX: 33.91 KG/M2 | SYSTOLIC BLOOD PRESSURE: 130 MMHG | OXYGEN SATURATION: 97 % | HEART RATE: 67 BPM | DIASTOLIC BLOOD PRESSURE: 70 MMHG

## 2022-05-18 PROCEDURE — 99214 OFFICE O/P EST MOD 30 MIN: CPT | Mod: 25

## 2022-05-18 PROCEDURE — 36415 COLL VENOUS BLD VENIPUNCTURE: CPT

## 2022-05-18 RX ORDER — SIMVASTATIN 10 MG/1
10 TABLET, FILM COATED ORAL
Qty: 90 | Refills: 1 | Status: COMPLETED | COMMUNITY
Start: 2017-09-18 | End: 2022-05-18

## 2022-05-18 NOTE — ASSESSMENT
[FreeTextEntry1] : -CPE: 08/2021\par -HIV/HC: refused.\par \par -HTN: well control. Continue meds.\par On amlodipine, Losartan, HCTZ, Metoprolol\par \par -DM: \par on metformin to 500mg BID. HbA1c: 7.2%. \par refused to start new med. Will check in 3 months. If HbA1c> 7 will add new med.\par On Atorvastatin\par \par -Mammo: 07/21 BIRAD 2. Referral made today.\par \par -Colonoscopy: 08/2020. Was told that she does not need to have any further testing since she is >70 years old.\par \par -Gyn: pt will schedule appointment w/ Gyn.\par \par -Bone scan: 06/2020: osteopenia\par \par -Immunizations:\par Up to date \par Pt requesting Ab levels to determine if further booster is warranted at this time.\par \par -Seen by cardiology: \par Elevated ALKP\par Blood drawn today\par \par

## 2022-05-18 NOTE — HISTORY OF PRESENT ILLNESS
[FreeTextEntry1] : DM f/up\par Meds refills [de-identified] : 73 y/o F, w/ hx DM on metformin well control, HTN on amlodipine, Losartan, Hctz, metoprolol, Dyslipidemia on simvastatin, obesity, presents today for f/up and meds refills\par Long hx vertigo been well controlled since vestibular therapy.\par Pt doing well. No complaints. \par Seen by Cardiology recently at [facility]. 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

## 2022-05-18 NOTE — HEALTH RISK ASSESSMENT
[Never] : Never [No] : In the past 12 months have you used drugs other than those required for medical reasons? No [No falls in past year] : Patient reported no falls in the past year [de-identified] : cardiology

## 2022-05-19 ENCOUNTER — RESULT CHARGE (OUTPATIENT)
Age: 72
End: 2022-05-19

## 2022-05-20 ENCOUNTER — NON-APPOINTMENT (OUTPATIENT)
Age: 72
End: 2022-05-20

## 2022-05-22 ENCOUNTER — RX RENEWAL (OUTPATIENT)
Age: 72
End: 2022-05-22

## 2022-05-23 LAB
ALBUMIN SERPL ELPH-MCNC: 4.5 G/DL
ALP BLD-CCNC: 170 IU/L
ALP BLD-CCNC: 174 U/L
ALP BONE CFR SERPL: 25 %
ALP INTEST CFR SERPL: 9 %
ALP LIVER CFR SERPL: 66 %
ALT SERPL-CCNC: 13 U/L
ANION GAP SERPL CALC-SCNC: 15 MMOL/L
AST SERPL-CCNC: 15 U/L
BILIRUB SERPL-MCNC: 0.5 MG/DL
BUN SERPL-MCNC: 15 MG/DL
CALCIUM SERPL-MCNC: 10 MG/DL
CHLORIDE SERPL-SCNC: 96 MMOL/L
CO2 SERPL-SCNC: 23 MMOL/L
COVID-19 NUCLEOCAPSID  GAM ANTIBODY INTERPRETATION: NEGATIVE
COVID-19 SPIKE DOMAIN ANTIBODY INTERPRETATION: POSITIVE
CREAT SERPL-MCNC: 0.86 MG/DL
EGFR: 71 ML/MIN/1.73M2
GGT SERPL-CCNC: 18 U/L
GLUCOSE SERPL-MCNC: 123 MG/DL
POTASSIUM SERPL-SCNC: 4.1 MMOL/L
PROT SERPL-MCNC: 7.6 G/DL
SARS-COV-2 AB SERPL IA-ACNC: >250 U/ML
SARS-COV-2 AB SERPL QL IA: 0.07 INDEX
SODIUM SERPL-SCNC: 134 MMOL/L

## 2022-07-12 ENCOUNTER — RX RENEWAL (OUTPATIENT)
Age: 72
End: 2022-07-12

## 2022-07-23 ENCOUNTER — RX RENEWAL (OUTPATIENT)
Age: 72
End: 2022-07-23

## 2022-08-02 ENCOUNTER — TRANSCRIPTION ENCOUNTER (OUTPATIENT)
Age: 72
End: 2022-08-02

## 2022-08-11 ENCOUNTER — TRANSCRIPTION ENCOUNTER (OUTPATIENT)
Age: 72
End: 2022-08-11

## 2022-08-11 RX ORDER — ALBUTEROL SULFATE 90 UG/1
108 (90 BASE) INHALANT RESPIRATORY (INHALATION)
Qty: 1 | Refills: 0 | Status: ACTIVE | COMMUNITY
Start: 2020-11-25 | End: 1900-01-01

## 2022-08-18 ENCOUNTER — RX RENEWAL (OUTPATIENT)
Age: 72
End: 2022-08-18

## 2022-09-03 ENCOUNTER — RX RENEWAL (OUTPATIENT)
Age: 72
End: 2022-09-03

## 2022-09-22 ENCOUNTER — RESULT CHARGE (OUTPATIENT)
Age: 72
End: 2022-09-22

## 2022-09-22 ENCOUNTER — APPOINTMENT (OUTPATIENT)
Dept: FAMILY MEDICINE | Facility: CLINIC | Age: 72
End: 2022-09-22

## 2022-09-22 VITALS
OXYGEN SATURATION: 97 % | DIASTOLIC BLOOD PRESSURE: 80 MMHG | WEIGHT: 203 LBS | TEMPERATURE: 97.5 F | BODY MASS INDEX: 32.62 KG/M2 | RESPIRATION RATE: 14 BRPM | HEART RATE: 74 BPM | HEIGHT: 66 IN | SYSTOLIC BLOOD PRESSURE: 124 MMHG

## 2022-09-22 DIAGNOSIS — Z87.828 PERSONAL HISTORY OF OTHER (HEALED) PHYSICAL INJURY AND TRAUMA: ICD-10-CM

## 2022-09-22 PROCEDURE — 83036 HEMOGLOBIN GLYCOSYLATED A1C: CPT | Mod: QW

## 2022-09-22 PROCEDURE — 99214 OFFICE O/P EST MOD 30 MIN: CPT | Mod: 25

## 2022-09-22 PROCEDURE — 36415 COLL VENOUS BLD VENIPUNCTURE: CPT

## 2022-09-22 PROCEDURE — 0124A: CPT

## 2022-09-22 NOTE — COUNSELING
[Fall prevention counseling provided] : Fall prevention counseling provided [Behavioral health counseling provided] : Behavioral health counseling provided [None] : None [Good understanding] : Patient has a good understanding of lifestyle changes and steps needed to achieve self management goal [Adequate lighting] : Adequate lighting [No throw rugs] : No throw rugs [Use proper foot wear] : Use proper foot wear

## 2022-09-27 LAB
ALBUMIN SERPL ELPH-MCNC: 4.5 G/DL
ALP BLD-CCNC: 150 IU/L
ALP BLD-CCNC: 155 U/L
ALP BONE CFR SERPL: 29 %
ALP INTEST CFR SERPL: 11 %
ALP LIVER CFR SERPL: 60 %
ALT SERPL-CCNC: 18 U/L
ANION GAP SERPL CALC-SCNC: 16 MMOL/L
AST SERPL-CCNC: 22 U/L
BILIRUB SERPL-MCNC: 0.6 MG/DL
BUN SERPL-MCNC: 12 MG/DL
CALCIUM SERPL-MCNC: 9.9 MG/DL
CHLORIDE SERPL-SCNC: 95 MMOL/L
CO2 SERPL-SCNC: 24 MMOL/L
CREAT SERPL-MCNC: 0.9 MG/DL
EGFR: 68 ML/MIN/1.73M2
GLUCOSE SERPL-MCNC: 145 MG/DL
POTASSIUM SERPL-SCNC: 4 MMOL/L
PROT SERPL-MCNC: 7.5 G/DL
SODIUM SERPL-SCNC: 135 MMOL/L

## 2022-11-28 ENCOUNTER — APPOINTMENT (OUTPATIENT)
Dept: FAMILY MEDICINE | Facility: CLINIC | Age: 72
End: 2022-11-28

## 2022-11-28 ENCOUNTER — NON-APPOINTMENT (OUTPATIENT)
Age: 72
End: 2022-11-28

## 2022-11-28 ENCOUNTER — RESULT CHARGE (OUTPATIENT)
Age: 72
End: 2022-11-28

## 2022-11-28 VITALS
TEMPERATURE: 97 F | BODY MASS INDEX: 31.5 KG/M2 | HEART RATE: 76 BPM | WEIGHT: 196 LBS | OXYGEN SATURATION: 97 % | RESPIRATION RATE: 14 BRPM | HEIGHT: 66 IN | SYSTOLIC BLOOD PRESSURE: 122 MMHG | DIASTOLIC BLOOD PRESSURE: 60 MMHG

## 2022-11-28 VITALS — DIASTOLIC BLOOD PRESSURE: 60 MMHG | SYSTOLIC BLOOD PRESSURE: 110 MMHG

## 2022-11-28 LAB — HBA1C MFR BLD HPLC: 7.3

## 2022-11-28 PROCEDURE — 93000 ELECTROCARDIOGRAM COMPLETE: CPT

## 2022-11-28 PROCEDURE — 36415 COLL VENOUS BLD VENIPUNCTURE: CPT

## 2022-11-28 PROCEDURE — 99214 OFFICE O/P EST MOD 30 MIN: CPT | Mod: 25

## 2022-11-28 RX ORDER — AMOXICILLIN 875 MG/1
875 TABLET, FILM COATED ORAL
Qty: 20 | Refills: 0 | Status: COMPLETED | COMMUNITY
Start: 2022-10-24

## 2022-11-28 RX ORDER — COVID-19 ANTIGEN TEST
KIT MISCELLANEOUS
Qty: 8 | Refills: 0 | Status: COMPLETED | COMMUNITY
Start: 2022-10-26

## 2022-11-28 RX ORDER — ACETIC ACID 20 MG/ML
2 SOLUTION AURICULAR (OTIC) DAILY
Qty: 1 | Refills: 0 | Status: DISCONTINUED | COMMUNITY
Start: 2022-09-22 | End: 2022-11-28

## 2022-11-28 NOTE — ASSESSMENT
[High Risk Surgery - Intraperitoneal, Intrathoracic or Supringuinal Vascular Procedures] : High Risk Surgery - Intraperitoneal, Intrathoracic or Supringuinal Vascular Procedures - No (0) [Ischemic Heart Disease] : Ischemic Heart Disease - No (0) [Congestive Heart Failure] : Congestive Heart Failure - No (0) [Prior Cerebrovascular Accident or TIA] : Prior Cerebrovascular Accident or TIA - No (0) [Creatinine >= 2mg/dL (1 Point)] : Creatinine >= 2mg/dL - No (0) [Insulin-dependent Diabetic (1 Point)] : Insulin-dependent Diabetic - No (0) [0] : 0 , RCRI Class: I, Risk of Post-Op Cardiac Complications: 3.9%, 95% CI for Risk Estimate: 2.8% - 5.4% [Patient Optimized for Surgery] : Patient optimized for surgery [No Further Testing Recommended] : no further testing recommended [Continue medications as is] : Continue current medications [As per surgery] : as per surgery [FreeTextEntry4] : Pt with no absolute contraindication for surgical procedure. Clear from clinical stand point.\par Pending COVID test.

## 2022-11-28 NOTE — HISTORY OF PRESENT ILLNESS
[No Pertinent Cardiac History] : no history of aortic stenosis, atrial fibrillation, coronary artery disease, recent myocardial infarction, or implantable device/pacemaker [Asthma] : asthma [No Adverse Anesthesia Reaction] : no adverse anesthesia reaction in self or family member [Diabetes] : diabetes [(Patient denies any chest pain, claudication, dyspnea on exertion, orthopnea, palpitations or syncope)] : Patient denies any chest pain, claudication, dyspnea on exertion, orthopnea, palpitations or syncope [COPD] : no COPD [Sleep Apnea] : no sleep apnea [Smoker] : not a smoker [Chronic Anticoagulation] : no chronic anticoagulation [Chronic Kidney Disease] : no chronic kidney disease [FreeTextEntry1] : retinal surgery [FreeTextEntry2] : 12/07/23 [FreeTextEntry3] : Dr renée Childress [FreeTextEntry4] : 71 y/o F, w/ hx DM on metformin and jardiance well control, HTN on amlodipine, Losartan, Hctz, metoprolol, Dyslipidemia on simvastatin, obesity, presents today for medical clearance.\par Long hx vertigo been well controlled since vestibular therapy.\par Pt doing well. \par Seen by Cardiology recently at [facility]. \par Had 3 dose Pfizer covid vaccine.\par \par

## 2022-11-30 LAB
ALBUMIN SERPL ELPH-MCNC: 4.1 G/DL
ALP BLD-CCNC: 152 U/L
ALT SERPL-CCNC: 15 U/L
ANION GAP SERPL CALC-SCNC: 12 MMOL/L
AST SERPL-CCNC: 17 U/L
BASOPHILS # BLD AUTO: 0.06 K/UL
BASOPHILS NFR BLD AUTO: 0.7 %
BILIRUB SERPL-MCNC: 0.4 MG/DL
BUN SERPL-MCNC: 19 MG/DL
CALCIUM SERPL-MCNC: 10.4 MG/DL
CHLORIDE SERPL-SCNC: 95 MMOL/L
CHOLEST SERPL-MCNC: 166 MG/DL
CO2 SERPL-SCNC: 27 MMOL/L
CREAT SERPL-MCNC: 0.99 MG/DL
EGFR: 60 ML/MIN/1.73M2
EOSINOPHIL # BLD AUTO: 0.5 K/UL
EOSINOPHIL NFR BLD AUTO: 6 %
GLUCOSE SERPL-MCNC: 137 MG/DL
HCT VFR BLD CALC: 39.6 %
HDLC SERPL-MCNC: 76 MG/DL
HGB BLD-MCNC: 12.7 G/DL
IMM GRANULOCYTES NFR BLD AUTO: 0.5 %
LDLC SERPL CALC-MCNC: 76 MG/DL
LYMPHOCYTES # BLD AUTO: 1.28 K/UL
LYMPHOCYTES NFR BLD AUTO: 15.3 %
MAN DIFF?: NORMAL
MCHC RBC-ENTMCNC: 27 PG
MCHC RBC-ENTMCNC: 32.1 GM/DL
MCV RBC AUTO: 84.1 FL
MONOCYTES # BLD AUTO: 0.59 K/UL
MONOCYTES NFR BLD AUTO: 7.1 %
NEUTROPHILS # BLD AUTO: 5.89 K/UL
NEUTROPHILS NFR BLD AUTO: 70.4 %
NONHDLC SERPL-MCNC: 90 MG/DL
PLATELET # BLD AUTO: 319 K/UL
POTASSIUM SERPL-SCNC: 4.2 MMOL/L
PROT SERPL-MCNC: 7.2 G/DL
RBC # BLD: 4.71 M/UL
RBC # FLD: 15.6 %
SODIUM SERPL-SCNC: 134 MMOL/L
TRIGL SERPL-MCNC: 69 MG/DL
WBC # FLD AUTO: 8.36 K/UL

## 2022-12-02 ENCOUNTER — APPOINTMENT (OUTPATIENT)
Dept: OTOLARYNGOLOGY | Facility: CLINIC | Age: 72
End: 2022-12-02

## 2022-12-02 ENCOUNTER — NON-APPOINTMENT (OUTPATIENT)
Age: 72
End: 2022-12-02

## 2022-12-02 VITALS
BODY MASS INDEX: 31.5 KG/M2 | WEIGHT: 196 LBS | SYSTOLIC BLOOD PRESSURE: 126 MMHG | HEIGHT: 66 IN | DIASTOLIC BLOOD PRESSURE: 70 MMHG | HEART RATE: 63 BPM

## 2022-12-02 DIAGNOSIS — H60.549 ACUTE ECZEMATOID OTITIS EXTERNA, UNSPECIFIED EAR: ICD-10-CM

## 2022-12-02 PROCEDURE — 99214 OFFICE O/P EST MOD 30 MIN: CPT | Mod: 25

## 2022-12-02 NOTE — PHYSICAL EXAM
[de-identified] : ECZEMA OF THE CANAL [de-identified] : DRY NASAL MUCOSA [Normal] : mucosa is normal [Midline] : trachea located in midline position

## 2022-12-02 NOTE — ASSESSMENT
[FreeTextEntry1] : ELOCON PRN\par MUPIROCIN BID X 10 DAYS\par EUCALYPTUS HUMIDIFIER\par F/U 1 MONTH PRN

## 2022-12-02 NOTE — HISTORY OF PRESENT ILLNESS
[de-identified] : MILD PND\par GREENISH COLOR AT TIMES\par NO FEVER\par MEDICAL HX REVIEWED\par SCHEDULED FOR RETINA SURGERY NEXT WEEK\par COVID VACCINATION UP TO DATE\par EAR ITCHING SOMETIME

## 2022-12-05 ENCOUNTER — RX RENEWAL (OUTPATIENT)
Age: 72
End: 2022-12-05

## 2022-12-05 ENCOUNTER — APPOINTMENT (OUTPATIENT)
Dept: OTOLARYNGOLOGY | Facility: CLINIC | Age: 72
End: 2022-12-05

## 2022-12-05 VITALS
WEIGHT: 196 LBS | DIASTOLIC BLOOD PRESSURE: 82 MMHG | HEART RATE: 68 BPM | HEIGHT: 66 IN | BODY MASS INDEX: 31.5 KG/M2 | SYSTOLIC BLOOD PRESSURE: 144 MMHG

## 2022-12-05 DIAGNOSIS — J31.0 CHRONIC RHINITIS: ICD-10-CM

## 2022-12-05 PROCEDURE — 99213 OFFICE O/P EST LOW 20 MIN: CPT

## 2022-12-05 NOTE — HISTORY OF PRESENT ILLNESS
[de-identified] : 72 yr old female scheduled for retinal surgery on 12/5/2022\par +discolored mucous for a week w min improvement\par also c/o sore throat and otalgia AS\par -fever\par +hx sinusitis\par \par eval by Dr Mcneal on 12/2 dx allergy

## 2022-12-05 NOTE — PHYSICAL EXAM
[Normal] : mucosa is normal [Midline] : trachea located in midline position [de-identified] : erythema [de-identified] : dry mucosa, old blood, yellow mucous bilat

## 2022-12-05 NOTE — ASSESSMENT
[FreeTextEntry1] : Afrin for 3d, sinus rinse, flonase\par rx Augmentin\par \par pt to call ophtho and tell them she has a sinus infection\par f/u prn failure to improve

## 2022-12-12 ENCOUNTER — NON-APPOINTMENT (OUTPATIENT)
Age: 72
End: 2022-12-12

## 2022-12-14 ENCOUNTER — TRANSCRIPTION ENCOUNTER (OUTPATIENT)
Age: 72
End: 2022-12-14

## 2023-01-19 ENCOUNTER — RX RENEWAL (OUTPATIENT)
Age: 73
End: 2023-01-19

## 2023-01-24 ENCOUNTER — TRANSCRIPTION ENCOUNTER (OUTPATIENT)
Age: 73
End: 2023-01-24

## 2023-02-07 ENCOUNTER — TRANSCRIPTION ENCOUNTER (OUTPATIENT)
Age: 73
End: 2023-02-07

## 2023-02-09 ENCOUNTER — APPOINTMENT (OUTPATIENT)
Dept: FAMILY MEDICINE | Facility: CLINIC | Age: 73
End: 2023-02-09
Payer: MEDICARE

## 2023-02-09 ENCOUNTER — RESULT CHARGE (OUTPATIENT)
Age: 73
End: 2023-02-09

## 2023-02-09 VITALS
TEMPERATURE: 97.1 F | HEART RATE: 55 BPM | BODY MASS INDEX: 32.14 KG/M2 | SYSTOLIC BLOOD PRESSURE: 130 MMHG | WEIGHT: 200 LBS | DIASTOLIC BLOOD PRESSURE: 70 MMHG | RESPIRATION RATE: 14 BRPM | HEIGHT: 66 IN | OXYGEN SATURATION: 98 %

## 2023-02-09 DIAGNOSIS — H35.00 UNSPECIFIED BACKGROUND RETINOPATHY: ICD-10-CM

## 2023-02-09 LAB — HBA1C MFR BLD HPLC: 7.4

## 2023-02-09 PROCEDURE — 99214 OFFICE O/P EST MOD 30 MIN: CPT | Mod: 25

## 2023-02-09 PROCEDURE — 36415 COLL VENOUS BLD VENIPUNCTURE: CPT

## 2023-02-09 RX ORDER — MUPIROCIN 20 MG/G
2 OINTMENT TOPICAL
Qty: 1 | Refills: 1 | Status: DISCONTINUED | COMMUNITY
Start: 2022-12-02 | End: 2023-02-09

## 2023-02-09 RX ORDER — FLUTICASONE PROPIONATE 50 UG/1
50 SPRAY, METERED NASAL
Qty: 3 | Refills: 3 | Status: DISCONTINUED | COMMUNITY
Start: 2022-12-05 | End: 2023-02-09

## 2023-02-09 RX ORDER — MOMETASONE FUROATE 1 MG/ML
0.1 SOLUTION TOPICAL
Qty: 1 | Refills: 3 | Status: DISCONTINUED | COMMUNITY
Start: 2022-12-02 | End: 2023-02-09

## 2023-02-09 RX ORDER — AMOXICILLIN AND CLAVULANATE POTASSIUM 875; 125 MG/1; MG/1
875-125 TABLET, COATED ORAL
Qty: 20 | Refills: 0 | Status: DISCONTINUED | COMMUNITY
Start: 2022-12-05 | End: 2023-02-09

## 2023-02-09 NOTE — HISTORY OF PRESENT ILLNESS
[No Pertinent Cardiac History] : no history of aortic stenosis, atrial fibrillation, coronary artery disease, recent myocardial infarction, or implantable device/pacemaker [Asthma] : asthma [No Adverse Anesthesia Reaction] : no adverse anesthesia reaction in self or family member [Diabetes] : diabetes [(Patient denies any chest pain, claudication, dyspnea on exertion, orthopnea, palpitations or syncope)] : Patient denies any chest pain, claudication, dyspnea on exertion, orthopnea, palpitations or syncope [COPD] : no COPD [Sleep Apnea] : no sleep apnea [Smoker] : not a smoker [Chronic Anticoagulation] : no chronic anticoagulation [Chronic Kidney Disease] : no chronic kidney disease [FreeTextEntry1] : retinal surgery revision [FreeTextEntry2] : 02/15/23 [FreeTextEntry3] : Dr shoshana Childress [FreeTextEntry4] : 73 y/o F, w/ hx DM on metformin and jardiance well control, HTN on amlodipine, Losartan, Hctz, metoprolol, Dyslipidemia on simvastatin, obesity, presents today for medical clearance. retinal surgery done 11/28/22 complicated with bleeding. Now schedule for revision.\par Long hx vertigo been well controlled since vestibular therapy.\par Pt doing well. \par Seen by Cardiology recently at [facility]. \par Had 3 dose Pfizer covid vaccine.\par \par

## 2023-02-10 ENCOUNTER — NON-APPOINTMENT (OUTPATIENT)
Age: 73
End: 2023-02-10

## 2023-02-10 LAB
INR PPP: 1.03 RATIO
PT BLD: 12.2 SEC

## 2023-02-13 ENCOUNTER — OUTPATIENT (OUTPATIENT)
Dept: OUTPATIENT SERVICES | Facility: HOSPITAL | Age: 73
LOS: 1 days | End: 2023-02-13
Payer: MEDICARE

## 2023-02-13 DIAGNOSIS — Z20.828 CONTACT WITH AND (SUSPECTED) EXPOSURE TO OTHER VIRAL COMMUNICABLE DISEASES: ICD-10-CM

## 2023-02-13 LAB — SARS-COV-2 RNA SPEC QL NAA+PROBE: SIGNIFICANT CHANGE UP

## 2023-02-13 PROCEDURE — U0005: CPT

## 2023-02-13 PROCEDURE — U0003: CPT

## 2023-02-14 ENCOUNTER — TRANSCRIPTION ENCOUNTER (OUTPATIENT)
Age: 73
End: 2023-02-14

## 2023-02-14 NOTE — ASU PATIENT PROFILE, ADULT - VISION (WITH CORRECTIVE LENSES IF THE PATIENT USUALLY WEARS THEM):
Right eye decreased/Partially impaired: cannot see medication labels or newsprint, but can see obstacles in path, and the surrounding layout; can count fingers at arm's length

## 2023-02-14 NOTE — ASU PATIENT PROFILE, ADULT - PRO ARRIVE FROM
[facility]     History and Physical    Date of Admission:  2022  9:40 PM    Primary Care Physician   Primary care provider: No primary care provider on file.    Assessment & Plan   Male-Raina Parent is a Term  appropriate for gestational age male  , doing well.   -Normal  care    TIM MARIE    Pregnancy History   The details of the mother's pregnancy are as follows:  OBSTETRIC HISTORY:  Information for the patient's mother:  Raina Leslie [[id number]]   30 year old     EDC:   Information for the patient's mother:  Raina Leslie [[id number]]   Estimated Date of Delivery: 22     Information for the patient's mother:  Raina Leslie [[id number]]     OB History    Para Term  AB Living   2 1 1 0 0 1   SAB IAB Ectopic Multiple Live Births   0 0 0 0 1      # Outcome Date GA Lbr Stanislaw/2nd Weight Sex Delivery Anes PTL Lv   2 Current            1 Term 19 37w6d  2.94 kg (6 lb 7.7 oz) F CS-LTranv Spinal N MICHELLE      Name: LOU,FEMALEJUAN      Apgar1: 8  Apgar5: 9        Prenatal Labs:   Information for the patient's mother:  Raina Leslie [[id number]]     Lab Results   Component Value Date    AS Negative 2022    HGB 2022        Prenatal Ultrasound:  Information for the patient's mother:  Raina Leslie [[id number]]   No results found for this or any previous visit.       GBS Status:   Information for the patient's mother:  Raina Leslie [[id number]]   No results found for: GBS     Unknown treated    Maternal History    (NOTE - see maternal data and prenatal history report to review, select from baby index report)    Medications given to Mother since admit:  (    NOTE: see index report to review using mother's meds - baby)    Family History - [place]   This patient has no significant family history    Social History - [place]   This  has no significant social history    Birth History   Infant  Resuscitation Needed: no    [place] Birth Information  Birth History     Apgar     One: 8     Five: 9     Delivery Method: , Spontaneous     Gestation Age: 40 3/7 wks     Duration of Labor: 2nd: 1h 45m       Physical Exam   Vital Signs:  No data found.  [place] Measurements:  Weight:      Length:      Head circumference:        General:  alert and normally responsive  Skin:  no abnormal markings; normal color without significant rash.  No jaundice  Head/Neck:  normal anterior and posterior fontanelle, intact scalp; Neck without masses  Eyes:  clear conjunctiva  Ears/Nose/Mouth:  intact canals, patent nares, mouth normal  Thorax:  normal contour, clavicles intact  Lungs:  clear, no retractions, no increased work of breathing  Heart:  normal rate, rhythm.  No murmurs.  Normal femoral pulses.  Abdomen:  soft without mass, tenderness, organomegaly, hernia.  Umbilicus normal.  Genitalia:  normal male external genitalia with testes descended bilaterally  Anus:  patent  Trunk/spine:  straight, intact  Muskuloskeletal:  Normal Vital and Ortolani maneuvers.  intact without deformity.  Normal digits.  Bilateral arms strong with full movement  Neurologic:  normal, symmetric tone and strength.  normal reflexes.       home

## 2023-02-14 NOTE — ASU PATIENT PROFILE, ADULT - NSICDXPASTSURGICALHX_GEN_ALL_CORE_FT
PAST SURGICAL HISTORY:  No significant past surgical history PAST SURGICAL HISTORY:  H/O dilation and curettage     Status post trigger finger release right hand

## 2023-02-14 NOTE — ASU PATIENT PROFILE, ADULT - NSICDXPASTMEDICALHX_GEN_ALL_CORE_FT
PAST MEDICAL HISTORY:  BCC (basal cell carcinoma)     Chronic rhinitis     Colon polyps     Controlled diabetes mellitus     Deep venous insufficiency     Eczema of external ear     Fatty liver     H/O acute sinusitis     H/O osteopenia     H/O vertigo     History of asthma     History of deviated nasal septum     History of fatty infiltration of liver     HLD (hyperlipidemia)     HTN (hypertension)     MADDY on CPAP     Sensory hearing loss, bilateral     Trigger index finger of right hand     Viral labyrinthitis      PAST MEDICAL HISTORY:  BCC (basal cell carcinoma) Face, head, left leg , right arm, back.    Chronic rhinitis     Colon polyps     Controlled diabetes mellitus     Deep venous insufficiency     Eczema of external ear Left ear    H/O acute sinusitis     H/O osteopenia     H/O vertigo     History of asthma     History of deviated nasal septum     History of fatty infiltration of liver     HLD (hyperlipidemia)     HTN (hypertension)     MADDY on CPAP Patient does not use her CPAP    Sensory hearing loss, bilateral     Trigger index finger of right hand     Viral labyrinthitis

## 2023-02-14 NOTE — ASU PATIENT PROFILE, ADULT - FALL HARM RISK - HARM RISK INTERVENTIONS

## 2023-02-15 ENCOUNTER — TRANSCRIPTION ENCOUNTER (OUTPATIENT)
Age: 73
End: 2023-02-15

## 2023-02-15 ENCOUNTER — OUTPATIENT (OUTPATIENT)
Dept: OUTPATIENT SERVICES | Facility: HOSPITAL | Age: 73
LOS: 1 days | End: 2023-02-15
Payer: MEDICARE

## 2023-02-15 VITALS
RESPIRATION RATE: 18 BRPM | HEART RATE: 72 BPM | OXYGEN SATURATION: 98 % | SYSTOLIC BLOOD PRESSURE: 115 MMHG | TEMPERATURE: 98 F | DIASTOLIC BLOOD PRESSURE: 65 MMHG

## 2023-02-15 VITALS
RESPIRATION RATE: 14 BRPM | WEIGHT: 199.74 LBS | OXYGEN SATURATION: 96 % | HEIGHT: 63.5 IN | HEART RATE: 63 BPM | DIASTOLIC BLOOD PRESSURE: 58 MMHG | SYSTOLIC BLOOD PRESSURE: 130 MMHG | TEMPERATURE: 97 F

## 2023-02-15 DIAGNOSIS — H43.11 VITREOUS HEMORRHAGE, RIGHT EYE: ICD-10-CM

## 2023-02-15 DIAGNOSIS — Z20.828 CONTACT WITH AND (SUSPECTED) EXPOSURE TO OTHER VIRAL COMMUNICABLE DISEASES: ICD-10-CM

## 2023-02-15 DIAGNOSIS — Z98.890 OTHER SPECIFIED POSTPROCEDURAL STATES: Chronic | ICD-10-CM

## 2023-02-15 LAB — GLUCOSE BLDC GLUCOMTR-MCNC: 129 MG/DL — HIGH (ref 70–99)

## 2023-02-15 PROCEDURE — 82962 GLUCOSE BLOOD TEST: CPT

## 2023-02-15 PROCEDURE — 67036 REMOVAL OF INNER EYE FLUID: CPT | Mod: AS,RT

## 2023-02-15 PROCEDURE — 67036 REMOVAL OF INNER EYE FLUID: CPT | Mod: RT

## 2023-02-15 RX ORDER — HYDROCHLOROTHIAZIDE 25 MG
1 TABLET ORAL
Qty: 0 | Refills: 0 | DISCHARGE

## 2023-02-15 RX ORDER — BECLOMETHASONE DIPROPIONATE 40 UG/1
0 AEROSOL, METERED RESPIRATORY (INHALATION)
Qty: 0 | Refills: 0 | DISCHARGE

## 2023-02-15 RX ORDER — ASPIRIN/CALCIUM CARB/MAGNESIUM 324 MG
1 TABLET ORAL
Qty: 0 | Refills: 0 | DISCHARGE

## 2023-02-15 RX ORDER — METOPROLOL TARTRATE 50 MG
1 TABLET ORAL
Qty: 0 | Refills: 0 | DISCHARGE

## 2023-02-15 RX ORDER — ATORVASTATIN CALCIUM 80 MG/1
1 TABLET, FILM COATED ORAL
Qty: 0 | Refills: 0 | DISCHARGE

## 2023-02-15 RX ORDER — LOSARTAN/HYDROCHLOROTHIAZIDE 100MG-25MG
1 TABLET ORAL
Qty: 0 | Refills: 0 | DISCHARGE

## 2023-02-15 RX ORDER — CHOLECALCIFEROL (VITAMIN D3) 125 MCG
1 CAPSULE ORAL
Qty: 0 | Refills: 0 | DISCHARGE

## 2023-02-15 RX ORDER — AMLODIPINE BESYLATE 2.5 MG/1
1 TABLET ORAL
Qty: 0 | Refills: 0 | DISCHARGE

## 2023-02-15 RX ORDER — ALBUTEROL 90 UG/1
2 AEROSOL, METERED ORAL
Qty: 0 | Refills: 0 | DISCHARGE

## 2023-02-15 RX ORDER — EMPAGLIFLOZIN 10 MG/1
1 TABLET, FILM COATED ORAL
Qty: 0 | Refills: 0 | DISCHARGE

## 2023-02-15 RX ORDER — METFORMIN HYDROCHLORIDE 850 MG/1
1 TABLET ORAL
Qty: 0 | Refills: 0 | DISCHARGE

## 2023-02-15 NOTE — ASU DISCHARGE PLAN (ADULT/PEDIATRIC) - CARE PROVIDER_API CALL
Jace Childress (MD)  Ophthalmology  [address] [place] [address], [address]  [place] [address]  Phone: [phone]  Fax: [phone]  Scheduled Appointment: 02/16/2023 09:00 AM   Jace Childress (MD)  Ophthalmology  [address] [place] [address], [address]  [place] [address]  Phone: [phone]  Fax: [phone]  Scheduled Appointment: 02/16/2023 09:00 AM   Jace Childress (MD)  Ophthalmology  [address] [place] [address], [address]  [place] [address]  Phone: [phone]  Fax: [phone]  Scheduled Appointment: 02/16/2023 09:00 AM

## 2023-02-15 NOTE — ASU DISCHARGE PLAN (ADULT/PEDIATRIC) - ASU DC SPECIAL INSTRUCTIONSFT
Maintain face down position  May sleep left or right side down  Continue to Hold aspirin until seen and discussed with physician tomorrow  Follow up in the office tomorrow

## 2023-02-15 NOTE — ASU DISCHARGE PLAN (ADULT/PEDIATRIC) - C. MAKE IMPORTANT PERSONAL OR BUSINESS DECISIONS
Reason For Visit  MAGDA GILLESPIE is an established  patient here today for a chief complaint of ear pain.   She is unaccompanied.        History of Present Illness    The patient presents with complaints of sudden onset of constant episodes of moderate right ear pain, described as aching and throbbing, non-radiating. On a scale of 1 to 10, the patient rates the pain as 4. Her symptoms are caused by no known event. Symptoms are improved by ibuprofen. Symptoms are not made worse by coughing, pulling on ear, pushing on ear, head movement, chewing, yawning and water exposure. Symptoms are unchanged. Risk Factors: no recent URI, no smoking and no passive smoke exposure Pertinent Medical History: allergic rhinitis and ceruminosis, but not eustachian dysfunction, recurrent otitis media, recurrent otitis externa, recurrent sinusitis, TMJ dysfunction and bruxism (right ear pain since yesterday.. Took motrin at 5.30 PM yesterday nad also at 12 MN last night).     LMP: the last menstrual period was 08/09/2018.      Review of Systems    Const: Normal.   Allergy & Immunology: Normal.   Eyes: Normal.   ENT: earache, but no discharge from the ears, no hearing loss, no nasal passage blockage, no nasal discharge, no sneezing, no post nasal drip, no sore throat, no dysphagia, no hoarseness and not white.   Neck: Normal.   CV: Normal.   Resp: Normal.   Breast: Normal.   GI: Normal.   : Normal.   Endo: Normal.   Heme/Lymph: Normal.   Musc: Normal.   Neuro: Normal.   Psych: Normal.   Skin: Normal.       Allergies  No Known Drug Allergies    Current Meds   1. Effexor 75 MG TABS;   Therapy: (Recorded:[id number]) to Recorded   2. Junel 1.5/30 1.5-30 MG-MCG Oral Tablet;   Therapy: (Recorded:[id number]) to Recorded    Active Problems  Streptococcal tonsillitis (J03.00)    Past Medical History  History of Anxiety and depression (F41.9,F32.9)    Surgical History  History of facial surgery    Family History  No pertinent family  history    Social History  Consumes alcohol occasionally (Z78.9)  Never a smoker  Non-smoker (Z78.9)    Review  Past medical history, family medical history, surgical history and social history reviewed.      Vitals  Signs   Recorded: 21Aug2018 12:41PM   Systolic: 130, RUE, Sitting  Diastolic: 60, RUE, Sitting  Recorded: 21Aug2018 12:37PM   Height: 5 ft 5 in  Weight: 220 lb   BMI Calculated: 36.61  BSA Calculated: 2.06  Temperature: 98.2 F, Oral  Heart Rate: 98  Respiration: 18    Physical Exam  Constitutional: alert, in no acute distress and current vital signs reviewed.   Head and Face ACW: atraumatic, no deformities, normocephalic.   Eyes ACW: no discharge, normal conjunctiva and no eyelid swelling.   ENT ACW: normal appearing outer ear, normal appearing nose. normal appearing external canal . right TM is red with loss of landmarks and light reflex. nasal mucosa moist and pinkno nasal discharge. normal lips. oral mucosa pink and moist, no oral lesions, normal appearing pharynx and normal appearing tongue.   Lymphatic ACW: no cervical lymphadenopathy.   Chest: normal chest appearance.   Pulmonary ACW: no respiratory distress, normal respiratory rate and effort and no accessory muscle use. breath sounds clear to auscultation bilaterally.   Cardiovascular: normal rate, no murmurs were heard, regular rhythm, normal S1 and normal S2. edema was not present in the lower extremities.   Psychiatric ACW: oriented to person, place and time. alert and awake, interactive and mood/affect were appropriate.   Skin, Hair, Nails ACW: no lesion(s), no rash and no wound(s). normal skin turgor.      Assessment  Acute suppurative otitis media of right ear without spontaneous rupture of tympanic  membrane, recurrence not specified (H66.001)   · Assessed By: GLORIA VOGT (Primary Care); Last Assessed: 21 Aug 2018    Plan  Amoxicillin 875 MG Oral Tablet; 1 tablet po BID x10 days    Ear Pain:   Diagnosis: otitis media.   Patient  Education & Instructions:   Avoid alcohol and dairy products.    Take 1 probiotic or consume 1 yogurt daily to lessen the GI side effects from the antibiotics.    Drink plenty of fluids and stay adequately hydrated.    Use acetaminophen or ibuprofen, as directed.    Take antibiotic as prescribed, until complete.    Counseled on avoiding using Q-Tips or any other objects in the ear.    For pain control, use warm compresses to the affected area.    Chewing, swallowing and yawning may decrease pressure in the affected ear.    Keep ear dry when in shower, bath or swimming. Use ear plugs if necessary.  Precautions:   Call the office if: you are unable to see your PCP within 2-3 days, call the Health Advisor at [phone] ([phone]).   Go to the ER if: fever > 102, nausea, vomiting, ear pain or hearing change.  Additional Notes:   Written handout given.    Patient expresses understanding of the plan.    Medical compliance with plan discussed and risks of non-compliance reviewed.    If symptoms continue or worsen after 24 hrs, seek immediate care.    Patient education completed on disease process, etiology & prognosis.    Return to clinic as clinically indicated as discussed with patient who verbalized understanding of & agreement with the plan.   Thank you for visiting [facility]. You do not require a follow-up visit. Please see your PCP for routine care. .       To view an electronic version of your office visit summary, please access your [employer] Patient Portal account. If you are not currently signed up and you provided us with your email address, please locate the email from \"[employer]\" and follow instructions to activate your account. Or you can visit [email or web address] to submit an online portal request form.              Signatures   Electronically signed by : MANDO PATTON; Aug 21 2018 12:46PM CST     Statement Selected

## 2023-02-15 NOTE — ASU DISCHARGE PLAN (ADULT/PEDIATRIC) - NS MD DC FALL RISK RISK
For information on Fall & Injury Prevention, visit: [email or web address]-[email or web address]-mobility OR  [email or web address].[email or web address]/[email or web address]-prevention-tips-[email or web address]-injury OR  https://www.cdc.gov/steadi/patient.html For information on Fall & Injury Prevention, visit: [email or web address]://www.[facility]/news/fall-[email or web address]-maintains-health-[email or web address]-mobility OR  [email or web address]://www.[facility]/news/fall-prevention-tips-[email or web address]-injury OR  https://www.cdc.gov/steadi/patient.html For information on Fall & Injury Prevention, visit: https://www.[facility]/news/fall-prevention-protects-and-maintains-health-and-mobility OR  https://www.[facility]/news/fall-prevention-tips-[email or web address]-injury OR  https://www.cdc.gov/steadi/patient.html

## 2023-03-09 NOTE — ASU DISCHARGE PLAN (ADULT/PEDIATRIC) - PLEASE INDICATE TEMPERATURE IN FAHRENHEIT OR CELSIUS
Rooming staff notes reviewed and agree    HPI:  Satish Hill presents for his routine physical exam. He has no other concerns at this time.    No current outpatient medications on file.     No current facility-administered medications for this visit.       Past Medical History:   Diagnosis Date   • KAYLEY (generalized anxiety disorder)        Past Surgical History:   Procedure Laterality Date   • [place] tooth extraction         Social History     Socioeconomic History   • Marital status: Single     Spouse name: Not on file   • Number of children: Not on file   • Years of education: Not on file   • Highest education level: Not on file   Occupational History   • Not on file   Tobacco Use   • Smoking status: Never   • Smokeless tobacco: Never   Vaping Use   • Vaping Use: never used   Substance and Sexual Activity   • Alcohol use: Yes     Comment: weekend   • Drug use: Yes     Types: Marijuana   • Sexual activity: Not on file   Other Topics Concern   • Not on file   Social History Narrative   • Not on file     Social Determinants of Health     Financial Resource Strain: Not on file   Food Insecurity: Not on file   Transportation Needs: Not on file   Physical Activity: Not on file   Stress: Not on file   Social Connections: Not on file   Intimate Partner Violence: Not on file           Family History   Problem Relation Age of Onset   • Hypertension Father          Review of Systems:  ALL OTHER NEG       Immunization History   Administered Date(s) Administered   • COVID Pfizer 12Y+ 04/23/2022   • COVID Pfizer 12Y+ (Requires Dilution) 06/12/2021, 07/10/2021   • Tdap 07/21/2020         PHYSICAL EXAM:    VITAL SIGNS:   Visit Vitals  /80 (BP Location: LUE - Left upper extremity, Patient Position: Sitting)   Pulse 82   Temp 97.4 °F (36.3 °C) (Temporal)   Ht 6' 1\" (1.854 m)   Wt 107.7 kg (237 lb 6.4 oz)   SpO2 100%   BMI 31.32 kg/m²     GENERAL APPEARANCE:Healthy, alert, in no distress.  Affect is varied and appropriate    SKIN:Skin color, texture, turgor are normal. There are no bruises, rashes or lesions.  HEAD:normocephalic, atraumatic  EARS:Negative  EYES:EOMI  THROAT:Negative  NECK:symmetric and supple  BACK:straight and symmetric  LUNGS:clear to auscultation  HEART: regular rate and rhythm  ABDOMEN: Soft, non-tender, bowel sounds normal,   EXTREMITIES:no  edema     NEUROLOGIC:Negative    ASSESSMENT: Healthy 25 year old male.                                1. Well adult exam    2. Need for hepatitis C screening test    3. FHx: diabetes mellitus    4. Obesity (BMI 30-39.9)          Diagnoses and all orders for this visit:  Well adult exam  -     LIPID PANEL WITH REFLEX; Future  Need for hepatitis C screening test  -     HEPATITIS C ANTIBODY WITH REFLEX; Future  FHx: diabetes mellitus  -     GLYCOHEMOGLOBIN; Future  -     COMPREHENSIVE METABOLIC PANEL; Future  Obesity (BMI 30-39.9)  -     GLYCOHEMOGLOBIN; Future  -     COMPREHENSIVE METABOLIC PANEL; Future  -     LIPID PANEL WITH REFLEX; Future          Discussed his current diet and exercise requirements and encouraged healthy lifestyle to reduce potential for serious health complications.    RTC in  6 mon / prn as needed   Please call back if not better or getting worse.       Call or return to clinic as needed if these symptoms worsen, fail to improve as anticipated, or if new symptoms develop.    PLAN: Follow up in 1 year for cpx.    101

## 2023-04-17 ENCOUNTER — NON-APPOINTMENT (OUTPATIENT)
Age: 73
End: 2023-04-17

## 2023-04-17 NOTE — HEALTH RISK ASSESSMENT
[Never] : Never [No] : In the past 12 months have you used drugs other than those required for medical reasons? No [No falls in past year] : Patient reported no falls in the past year [de-identified] : cardiology

## 2023-04-17 NOTE — REVIEW OF SYSTEMS
[Patient Intake Form Reviewed] : Patient intake form was reviewed [Negative] : Heme/Lymph [FreeTextEntry4] : left ear itchy

## 2023-04-17 NOTE — HISTORY OF PRESENT ILLNESS
[FreeTextEntry1] : DM f/up\par Meds refills [de-identified] : 71 y/o F, w/ hx DM on metformin well control, HTN on amlodipine, Losartan, Hctz, metoprolol, Dyslipidemia on simvastatin, obesity, presents today for f/up and meds refills\par Long hx vertigo been well controlled since vestibular therapy.\par Pt doing well. reports itchiness and dry  left ear.\par Seen by Cardiology recently at [facility]. \par Pt works as Sub teacher. Not sure to return to work due to Pandemic.\par Had 3 dose Pfizer covid vaccine.\par Leaving to [place] during winter time.\par

## 2023-04-17 NOTE — ASSESSMENT
[FreeTextEntry1] : -CPE: 08/2021\par -HIV/HC: refused.\par \par -HTN: well control. Continue meds.\par On amlodipine, Losartan, HCTZ, Metoprolol\par \par -DM: \par on metformin to 500mg BID. HbA1c: 6.7%. \par on Jardiance 10mg \par On Atorvastatin\par Glucose monitoring done once a day. \par \par -Mammo: 08/22 BIRAD 2. \par \par -Colonoscopy: 08/2020. Was told that she does not need to have any further testing since she is >70 years old.\par \par -Gyn: pt will schedule appointment w/ Gyn.\par \par -Bone scan: 08/22: osteopenia\par \par -Immunizations:\par FLU and Covid Bivalent today\par \par -Seen by cardiology: \par \par # Itchy ear\par -Start acetic acid solution\par # Elevated ALKP\par -Seen by GI in the past\par Blood drawn today\par \par

## 2023-04-17 NOTE — PHYSICAL EXAM
[Well Nourished] : well nourished [Well Developed] : well developed [Well-Appearing] : well-appearing [Normal Sclera/Conjunctiva] : normal sclera/conjunctiva [PERRL] : pupils equal round and reactive to light [EOMI] : extraocular movements intact [Normal Outer Ear/Nose] : the outer ears and nose were normal in appearance [Normal Oropharynx] : the oropharynx was normal [No JVD] : no jugular venous distention [No Lymphadenopathy] : no lymphadenopathy [Supple] : supple [Thyroid Normal, No Nodules] : the thyroid was normal and there were no nodules present [No Respiratory Distress] : no respiratory distress  [No Accessory Muscle Use] : no accessory muscle use [Clear to Auscultation] : lungs were clear to auscultation bilaterally [Normal Rate] : normal rate  [Regular Rhythm] : with a regular rhythm [Normal S1, S2] : normal S1 and S2 [No Murmur] : no murmur heard [No Carotid Bruits] : no carotid bruits [No Abdominal Bruit] : a ~M bruit was not heard ~T in the abdomen [No Varicosities] : no varicosities [Pedal Pulses Present] : the pedal pulses are present [No Edema] : there was no peripheral edema [No Palpable Aorta] : no palpable aorta [No Extremity Clubbing/Cyanosis] : no extremity clubbing/cyanosis [Soft] : abdomen soft [Non Tender] : non-tender [Non-distended] : non-distended [No Masses] : no abdominal mass palpated [No HSM] : no HSM [Normal Bowel Sounds] : normal bowel sounds [Normal Posterior Cervical Nodes] : no posterior cervical lymphadenopathy [Normal Anterior Cervical Nodes] : no anterior cervical lymphadenopathy [No CVA Tenderness] : no CVA  tenderness [No Spinal Tenderness] : no spinal tenderness [No Joint Swelling] : no joint swelling [Grossly Normal Strength/Tone] : grossly normal strength/tone [No Rash] : no rash [Coordination Grossly Intact] : coordination grossly intact [No Focal Deficits] : no focal deficits [Normal Gait] : normal gait [Normal Affect] : the affect was normal [Normal Insight/Judgement] : insight and judgment were intact [de-identified] : left ear: dry scales in external canal.

## 2023-05-15 ENCOUNTER — TRANSCRIPTION ENCOUNTER (OUTPATIENT)
Age: 73
End: 2023-05-15

## 2023-05-18 ENCOUNTER — NON-APPOINTMENT (OUTPATIENT)
Age: 73
End: 2023-05-18

## 2023-05-26 ENCOUNTER — RX RENEWAL (OUTPATIENT)
Age: 73
End: 2023-05-26

## 2023-06-02 ENCOUNTER — RX RENEWAL (OUTPATIENT)
Age: 73
End: 2023-06-02

## 2023-06-09 PROBLEM — Z87.09 PERSONAL HISTORY OF OTHER DISEASES OF THE RESPIRATORY SYSTEM: Chronic | Status: ACTIVE | Noted: 2023-02-15

## 2023-06-09 PROBLEM — H90.3 SENSORINEURAL HEARING LOSS, BILATERAL: Chronic | Status: ACTIVE | Noted: 2023-02-15

## 2023-06-09 PROBLEM — E11.9 TYPE 2 DIABETES MELLITUS WITHOUT COMPLICATIONS: Chronic | Status: ACTIVE | Noted: 2023-02-15

## 2023-06-09 PROBLEM — I87.2 VENOUS INSUFFICIENCY (CHRONIC) (PERIPHERAL): Chronic | Status: ACTIVE | Noted: 2023-02-15

## 2023-06-09 PROBLEM — H83.09 LABYRINTHITIS, UNSPECIFIED EAR: Chronic | Status: ACTIVE | Noted: 2023-02-15

## 2023-06-09 PROBLEM — M65.321 TRIGGER FINGER, RIGHT INDEX FINGER: Chronic | Status: ACTIVE | Noted: 2023-02-15

## 2023-06-09 PROBLEM — H60.549 ACUTE ECZEMATOID OTITIS EXTERNA, UNSPECIFIED EAR: Chronic | Status: ACTIVE | Noted: 2023-02-15

## 2023-06-09 PROBLEM — Z87.39 PERSONAL HISTORY OF OTHER DISEASES OF THE MUSCULOSKELETAL SYSTEM AND CONNECTIVE TISSUE: Chronic | Status: ACTIVE | Noted: 2023-02-15

## 2023-06-09 PROBLEM — Z87.19 PERSONAL HISTORY OF OTHER DISEASES OF THE DIGESTIVE SYSTEM: Chronic | Status: ACTIVE | Noted: 2023-02-15

## 2023-06-09 PROBLEM — I10 ESSENTIAL (PRIMARY) HYPERTENSION: Chronic | Status: ACTIVE | Noted: 2023-02-15

## 2023-06-09 PROBLEM — E78.5 HYPERLIPIDEMIA, UNSPECIFIED: Chronic | Status: ACTIVE | Noted: 2023-02-15

## 2023-06-09 PROBLEM — C44.91 BASAL CELL CARCINOMA OF SKIN, UNSPECIFIED: Chronic | Status: ACTIVE | Noted: 2023-02-15

## 2023-06-09 PROBLEM — G47.33 OBSTRUCTIVE SLEEP APNEA (ADULT) (PEDIATRIC): Chronic | Status: ACTIVE | Noted: 2023-02-15

## 2023-06-09 PROBLEM — Z87.898 PERSONAL HISTORY OF OTHER SPECIFIED CONDITIONS: Chronic | Status: ACTIVE | Noted: 2023-02-15

## 2023-06-09 PROBLEM — J31.0 CHRONIC RHINITIS: Chronic | Status: ACTIVE | Noted: 2023-02-15

## 2023-06-09 PROBLEM — K63.5 POLYP OF COLON: Chronic | Status: ACTIVE | Noted: 2023-02-15

## 2023-06-21 ENCOUNTER — APPOINTMENT (OUTPATIENT)
Dept: FAMILY MEDICINE | Facility: CLINIC | Age: 73
End: 2023-06-21

## 2023-07-11 ENCOUNTER — NON-APPOINTMENT (OUTPATIENT)
Age: 73
End: 2023-07-11

## 2023-07-11 ENCOUNTER — APPOINTMENT (OUTPATIENT)
Dept: FAMILY MEDICINE | Facility: CLINIC | Age: 73
End: 2023-07-11
Payer: MEDICARE

## 2023-07-11 VITALS
OXYGEN SATURATION: 98 % | HEART RATE: 72 BPM | WEIGHT: 203 LBS | DIASTOLIC BLOOD PRESSURE: 74 MMHG | RESPIRATION RATE: 14 BRPM | TEMPERATURE: 97.6 F | BODY MASS INDEX: 32.62 KG/M2 | SYSTOLIC BLOOD PRESSURE: 140 MMHG | HEIGHT: 66 IN

## 2023-07-11 DIAGNOSIS — Z00.00 ENCOUNTER FOR GENERAL ADULT MEDICAL EXAMINATION W/OUT ABNORMAL FINDINGS: ICD-10-CM

## 2023-07-11 DIAGNOSIS — Z87.09 PERSONAL HISTORY OF OTHER DISEASES OF THE RESPIRATORY SYSTEM: ICD-10-CM

## 2023-07-11 DIAGNOSIS — J31.0 CHRONIC RHINITIS: ICD-10-CM

## 2023-07-11 DIAGNOSIS — Z11.52 ENCOUNTER FOR SCREENING FOR COVID-19: ICD-10-CM

## 2023-07-11 DIAGNOSIS — H26.9 UNSPECIFIED CATARACT: ICD-10-CM

## 2023-07-11 DIAGNOSIS — Z11.59 ENCOUNTER FOR SCREENING FOR OTHER VIRAL DISEASES: ICD-10-CM

## 2023-07-11 DIAGNOSIS — L29.9 PRURITUS, UNSPECIFIED: ICD-10-CM

## 2023-07-11 PROCEDURE — 99214 OFFICE O/P EST MOD 30 MIN: CPT | Mod: 25

## 2023-07-11 PROCEDURE — 93000 ELECTROCARDIOGRAM COMPLETE: CPT

## 2023-07-11 NOTE — PLAN
[FreeTextEntry1] : \par \par \par \par Medical clearance discussed with and reviewed by supervising attending Dr Beltran Walters MD

## 2023-07-11 NOTE — HISTORY OF PRESENT ILLNESS
[No Pertinent Cardiac History] : no history of aortic stenosis, atrial fibrillation, coronary artery disease, recent myocardial infarction, or implantable device/pacemaker [Asthma] : asthma [No Adverse Anesthesia Reaction] : no adverse anesthesia reaction in self or family member [Diabetes] : diabetes [(Patient denies any chest pain, claudication, dyspnea on exertion, orthopnea, palpitations or syncope)] : Patient denies any chest pain, claudication, dyspnea on exertion, orthopnea, palpitations or syncope [COPD] : no COPD [Sleep Apnea] : no sleep apnea [Smoker] : not a smoker [Chronic Anticoagulation] : no chronic anticoagulation [Chronic Kidney Disease] : no chronic kidney disease [FreeTextEntry1] : CATARACT EXTRACTION WITH IOL OU [FreeTextEntry2] : 07/19/23 & 8/16/23 [FreeTextEntry3] : Dr Howard [FreeTextEntry4] : 72 y/o F, w/ hx DM on metformin and Jardiance well control, HTN on Amlodipine, Losartan, Hctz, Metoprolol, Dyslipidemia on Simvastatin, obesity, presents today for medical clearance. retinal surgery done 11/28/22 complicated with bleeding s/p revision on 2/15/23.\par Long hx vertigo been well controlled since vestibular therapy.\par Pt doing well. \par Seen by Cardiology at [facility]. \par Had 3 dose Pfizer covid vaccine.\par \par

## 2023-07-11 NOTE — ASSESSMENT
[High Risk Surgery - Intraperitoneal, Intrathoracic or Supringuinal Vascular Procedures] : High Risk Surgery - Intraperitoneal, Intrathoracic or Supringuinal Vascular Procedures - No (0) [Ischemic Heart Disease] : Ischemic Heart Disease - No (0) [Congestive Heart Failure] : Congestive Heart Failure - No (0) [Prior Cerebrovascular Accident or TIA] : Prior Cerebrovascular Accident or TIA - No (0) [Creatinine >= 2mg/dL (1 Point)] : Creatinine >= 2mg/dL - No (0) [Insulin-dependent Diabetic (1 Point)] : Insulin-dependent Diabetic - No (0) [0] : 0 , RCRI Class: I, Risk of Post-Op Cardiac Complications: 3.9%, 95% CI for Risk Estimate: 2.8% - 5.4% [Patient Optimized for Surgery] : Patient optimized for surgery [No Further Testing Recommended] : no further testing recommended [Modify medications prior to procedure] : Modify medications prior to procedure [As per surgery] : as per surgery [FreeTextEntry4] : Pt with no absolute contraindication for surgical procedure. Clear from clinical stand point.\par  [FreeTextEntry7] : Take anti-hypertensive medication with small sip of water on the day of the procedure. Avoid NSAIDs starting 1 week prior to procedure

## 2023-07-27 ENCOUNTER — APPOINTMENT (OUTPATIENT)
Dept: FAMILY MEDICINE | Facility: CLINIC | Age: 73
End: 2023-07-27
Payer: MEDICARE

## 2023-07-27 ENCOUNTER — RESULT CHARGE (OUTPATIENT)
Age: 73
End: 2023-07-27

## 2023-07-27 VITALS
HEIGHT: 66 IN | SYSTOLIC BLOOD PRESSURE: 142 MMHG | DIASTOLIC BLOOD PRESSURE: 72 MMHG | TEMPERATURE: 98.1 F | RESPIRATION RATE: 15 BRPM | BODY MASS INDEX: 33.11 KG/M2 | HEART RATE: 64 BPM | OXYGEN SATURATION: 97 % | WEIGHT: 206 LBS

## 2023-07-27 DIAGNOSIS — Z01.818 ENCOUNTER FOR OTHER PREPROCEDURAL EXAMINATION: ICD-10-CM

## 2023-07-27 DIAGNOSIS — H26.9 UNSPECIFIED CATARACT: ICD-10-CM

## 2023-07-27 PROCEDURE — 99214 OFFICE O/P EST MOD 30 MIN: CPT | Mod: 25

## 2023-07-27 PROCEDURE — 83036 HEMOGLOBIN GLYCOSYLATED A1C: CPT | Mod: QW

## 2023-07-27 PROCEDURE — 36415 COLL VENOUS BLD VENIPUNCTURE: CPT

## 2023-07-28 LAB
ANION GAP SERPL CALC-SCNC: 15 MMOL/L
APPEARANCE: CLEAR
BILIRUBIN URINE: NEGATIVE
BLOOD URINE: NEGATIVE
BUN SERPL-MCNC: 19 MG/DL
CALCIUM SERPL-MCNC: 10.1 MG/DL
CHLORIDE SERPL-SCNC: 93 MMOL/L
CO2 SERPL-SCNC: 23 MMOL/L
COLOR: YELLOW
CREAT SERPL-MCNC: 0.97 MG/DL
EGFR: 61 ML/MIN/1.73M2
GLUCOSE QUALITATIVE U: >=1000 MG/DL
GLUCOSE SERPL-MCNC: 100 MG/DL
KETONES URINE: NEGATIVE MG/DL
LEUKOCYTE ESTERASE URINE: NEGATIVE
NITRITE URINE: NEGATIVE
PH URINE: 6.5
POTASSIUM SERPL-SCNC: 4.4 MMOL/L
PROTEIN URINE: NEGATIVE MG/DL
SODIUM SERPL-SCNC: 131 MMOL/L
SPECIFIC GRAVITY URINE: 1.01
UROBILINOGEN URINE: 0.2 MG/DL

## 2023-07-28 NOTE — PHYSICAL EXAM
[No Acute Distress] : no acute distress [Well Nourished] : well nourished [Well Developed] : well developed [Well-Appearing] : well-appearing [Normal Sclera/Conjunctiva] : normal sclera/conjunctiva [PERRL] : pupils equal round and reactive to light [EOMI] : extraocular movements intact [Normal Outer Ear/Nose] : the outer ears and nose were normal in appearance [Normal Oropharynx] : the oropharynx was normal [No JVD] : no jugular venous distention [No Lymphadenopathy] : no lymphadenopathy [Supple] : supple [Thyroid Normal, No Nodules] : the thyroid was normal and there were no nodules present [No Respiratory Distress] : no respiratory distress  [No Accessory Muscle Use] : no accessory muscle use [Clear to Auscultation] : lungs were clear to auscultation bilaterally [Normal Rate] : normal rate  [Regular Rhythm] : with a regular rhythm [Normal S1, S2] : normal S1 and S2 [No Carotid Bruits] : no carotid bruits [No Abdominal Bruit] : a ~M bruit was not heard ~T in the abdomen [No Varicosities] : no varicosities [Pedal Pulses Present] : the pedal pulses are present [No Edema] : there was no peripheral edema [No Palpable Aorta] : no palpable aorta [No Extremity Clubbing/Cyanosis] : no extremity clubbing/cyanosis [Soft] : abdomen soft [Non Tender] : non-tender [Non-distended] : non-distended [No Masses] : no abdominal mass palpated [No HSM] : no HSM [Normal Bowel Sounds] : normal bowel sounds [No CVA Tenderness] : no CVA  tenderness [No Spinal Tenderness] : no spinal tenderness [No Joint Swelling] : no joint swelling [Grossly Normal Strength/Tone] : grossly normal strength/tone [No Rash] : no rash [Coordination Grossly Intact] : coordination grossly intact [No Focal Deficits] : no focal deficits [Normal Gait] : normal gait [Deep Tendon Reflexes (DTR)] : deep tendon reflexes were 2+ and symmetric [Normal Affect] : the affect was normal [Normal Insight/Judgement] : insight and judgment were intact [de-identified] : 1-2 systolic murmur present

## 2023-07-28 NOTE — HISTORY OF PRESENT ILLNESS
[No Pertinent Cardiac History] : no history of aortic stenosis, atrial fibrillation, coronary artery disease, recent myocardial infarction, or implantable device/pacemaker [Asthma] : asthma [No Adverse Anesthesia Reaction] : no adverse anesthesia reaction in self or family member [Diabetes] : diabetes [(Patient denies any chest pain, claudication, dyspnea on exertion, orthopnea, palpitations or syncope)] : Patient denies any chest pain, claudication, dyspnea on exertion, orthopnea, palpitations or syncope [COPD] : no COPD [Sleep Apnea] : no sleep apnea [Smoker] : not a smoker [Chronic Anticoagulation] : no chronic anticoagulation [Chronic Kidney Disease] : no chronic kidney disease [FreeTextEntry1] : Left Cataract sx [FreeTextEntry2] : 08/03/23 [FreeTextEntry3] : Dr JARAMILLO [FreeTextEntry4] : 72 y/o F, w/ hx DM on metformin and jardiance well control, HTN on amlodipine, Losartan, Hctz, metoprolol, Dyslipidemia on simvastatin, obesity, presents today for medical clearance for Cataract sx 8/03/23.\par Long hx vertigo been well controlled since vestibular therapy.\par Pt doing well. \par Seen by Cardiology recently at [facility]. \par Had 3 dose Pfizer covid vaccine.\par \par

## 2023-08-09 ENCOUNTER — RX RENEWAL (OUTPATIENT)
Age: 73
End: 2023-08-09

## 2023-08-30 ENCOUNTER — RX RENEWAL (OUTPATIENT)
Age: 73
End: 2023-08-30

## 2023-10-03 ENCOUNTER — RX RENEWAL (OUTPATIENT)
Age: 73
End: 2023-10-03

## 2023-10-12 ENCOUNTER — APPOINTMENT (OUTPATIENT)
Dept: FAMILY MEDICINE | Facility: CLINIC | Age: 73
End: 2023-10-12
Payer: MEDICARE

## 2023-10-12 ENCOUNTER — RX RENEWAL (OUTPATIENT)
Age: 73
End: 2023-10-12

## 2023-10-12 VITALS
HEART RATE: 70 BPM | DIASTOLIC BLOOD PRESSURE: 70 MMHG | HEIGHT: 66 IN | BODY MASS INDEX: 32.3 KG/M2 | SYSTOLIC BLOOD PRESSURE: 122 MMHG | OXYGEN SATURATION: 98 % | RESPIRATION RATE: 16 BRPM | WEIGHT: 201 LBS

## 2023-10-12 DIAGNOSIS — Z23 ENCOUNTER FOR IMMUNIZATION: ICD-10-CM

## 2023-10-12 DIAGNOSIS — M85.80 OTHER SPECIFIED DISORDERS OF BONE DENSITY AND STRUCTURE, UNSPECIFIED SITE: ICD-10-CM

## 2023-10-12 PROCEDURE — 99214 OFFICE O/P EST MOD 30 MIN: CPT | Mod: 25

## 2023-10-12 PROCEDURE — G0008: CPT

## 2023-10-12 PROCEDURE — 36415 COLL VENOUS BLD VENIPUNCTURE: CPT

## 2023-10-12 PROCEDURE — 90662 IIV NO PRSV INCREASED AG IM: CPT

## 2023-10-12 PROCEDURE — 83036 HEMOGLOBIN GLYCOSYLATED A1C: CPT | Mod: QW

## 2023-10-13 LAB
25(OH)D3 SERPL-MCNC: 39 NG/ML
ALBUMIN SERPL ELPH-MCNC: 4.6 G/DL
ALP BLD-CCNC: 155 U/L
ALT SERPL-CCNC: 22 U/L
ANION GAP SERPL CALC-SCNC: 14 MMOL/L
AST SERPL-CCNC: 21 U/L
BILIRUB SERPL-MCNC: 0.6 MG/DL
BUN SERPL-MCNC: 20 MG/DL
CALCIUM SERPL-MCNC: 10.1 MG/DL
CHLORIDE SERPL-SCNC: 94 MMOL/L
CHOLEST SERPL-MCNC: 164 MG/DL
CO2 SERPL-SCNC: 25 MMOL/L
CREAT SERPL-MCNC: 0.94 MG/DL
EGFR: 64 ML/MIN/1.73M2
GLUCOSE SERPL-MCNC: 142 MG/DL
HCT VFR BLD CALC: 43.5 %
HDLC SERPL-MCNC: 74 MG/DL
HGB BLD-MCNC: 13.7 G/DL
LDLC SERPL CALC-MCNC: 74 MG/DL
MCHC RBC-ENTMCNC: 27 PG
MCHC RBC-ENTMCNC: 31.5 GM/DL
MCV RBC AUTO: 85.8 FL
NONHDLC SERPL-MCNC: 91 MG/DL
PLATELET # BLD AUTO: 263 K/UL
POTASSIUM SERPL-SCNC: 4.3 MMOL/L
PROT SERPL-MCNC: 7.7 G/DL
RBC # BLD: 5.07 M/UL
RBC # FLD: 15.8 %
SODIUM SERPL-SCNC: 133 MMOL/L
TRIGL SERPL-MCNC: 90 MG/DL
WBC # FLD AUTO: 6.87 K/UL

## 2023-10-17 ENCOUNTER — NON-APPOINTMENT (OUTPATIENT)
Age: 73
End: 2023-10-17

## 2023-10-30 ENCOUNTER — TRANSCRIPTION ENCOUNTER (OUTPATIENT)
Age: 73
End: 2023-10-30

## 2023-10-30 DIAGNOSIS — R92.2 INCONCLUSIVE MAMMOGRAM: ICD-10-CM

## 2023-10-31 ENCOUNTER — TRANSCRIPTION ENCOUNTER (OUTPATIENT)
Age: 73
End: 2023-10-31

## 2023-11-08 ENCOUNTER — RX RENEWAL (OUTPATIENT)
Age: 73
End: 2023-11-08

## 2023-11-29 ENCOUNTER — RX RENEWAL (OUTPATIENT)
Age: 73
End: 2023-11-29

## 2023-11-30 ENCOUNTER — RX RENEWAL (OUTPATIENT)
Age: 73
End: 2023-11-30

## 2023-12-28 ENCOUNTER — TRANSCRIPTION ENCOUNTER (OUTPATIENT)
Age: 73
End: 2023-12-28

## 2024-01-02 ENCOUNTER — TRANSCRIPTION ENCOUNTER (OUTPATIENT)
Age: 74
End: 2024-01-02

## 2024-01-18 ENCOUNTER — APPOINTMENT (OUTPATIENT)
Dept: FAMILY MEDICINE | Facility: CLINIC | Age: 74
End: 2024-01-18
Payer: MEDICARE

## 2024-01-18 VITALS
HEIGHT: 66 IN | WEIGHT: 198 LBS | BODY MASS INDEX: 31.82 KG/M2 | DIASTOLIC BLOOD PRESSURE: 56 MMHG | OXYGEN SATURATION: 96 % | HEART RATE: 62 BPM | TEMPERATURE: 97 F | SYSTOLIC BLOOD PRESSURE: 122 MMHG | RESPIRATION RATE: 14 BRPM

## 2024-01-18 DIAGNOSIS — R74.8 ABNORMAL LEVELS OF OTHER SERUM ENZYMES: ICD-10-CM

## 2024-01-18 PROCEDURE — 36415 COLL VENOUS BLD VENIPUNCTURE: CPT

## 2024-01-18 PROCEDURE — G2211 COMPLEX E/M VISIT ADD ON: CPT

## 2024-01-18 PROCEDURE — 83036 HEMOGLOBIN GLYCOSYLATED A1C: CPT | Mod: QW

## 2024-01-18 PROCEDURE — 99214 OFFICE O/P EST MOD 30 MIN: CPT

## 2024-01-18 NOTE — ASSESSMENT
[FreeTextEntry1] : -CPE: 08/2021> advise to schedule -HIV/HC: refused. Mammo Nov 2023> BIRAD 3> f/up in 6 months -Colonoscopy: 08/2020. Was told that she does not need to have any further testing since she is >70 years old. -Gyn: pt will schedule appointment w/ Gyn. -Bone scan: 08/22: osteopenia  # -HTN: well control. Continue meds. On amlodipine, Losartan, HCTZ, Metoprolol  #-DM: on metformin to 500mg BID. HbA1c: 6.7%.---> 8.1% on Jardiance 10mg> Increase to 25 mg (sample x 2 weeks) On Atorvastatin Glucose monitoring done once a day.   Immunizations: -FLU  11/2023  # Elevated ALKP -Seen by GI in the past Blood drawn today  # easy bruising -Blood drawn today

## 2024-01-18 NOTE — HISTORY OF PRESENT ILLNESS
[FreeTextEntry1] : F/u  [de-identified] : 73, pmhx DM-2 on metformin and jardiance well control, HTN on amlodipine, Losartan, Hctz, metoprolol, Dyslipidemia on simvastatin, obesity. Long hx vertigo been well controlled since vestibular therapy. Here for f/u Pt doing well c/o recurrent easy bruising with no trauma or injury

## 2024-01-18 NOTE — HEALTH RISK ASSESSMENT
[No] : In the past 12 months have you used drugs other than those required for medical reasons? No [Never] : Never [de-identified] : Ophthalmology

## 2024-01-22 LAB
ALBUMIN SERPL ELPH-MCNC: 4.3 G/DL
ALP BLD-CCNC: 170 U/L
ALT SERPL-CCNC: 16 U/L
ANION GAP SERPL CALC-SCNC: 23 MMOL/L
AST SERPL-CCNC: 19 U/L
BILIRUB SERPL-MCNC: 0.6 MG/DL
BUN SERPL-MCNC: 23 MG/DL
CALCIUM SERPL-MCNC: 10 MG/DL
CHLORIDE SERPL-SCNC: 95 MMOL/L
CO2 SERPL-SCNC: 18 MMOL/L
CREAT SERPL-MCNC: 1.06 MG/DL
DEPRECATED KAPPA LC FREE/LAMBDA SER: 1.44 RATIO
EGFR: 55 ML/MIN/1.73M2
FOLATE SERPL-MCNC: 15.2 NG/ML
GGT SERPL-CCNC: 16 U/L
GLUCOSE SERPL-MCNC: 158 MG/DL
HCT VFR BLD CALC: 42.9 %
HGB BLD-MCNC: 14.7 G/DL
IGA SER QL IEP: 473 MG/DL
IGG SER QL IEP: 1194 MG/DL
IGM SER QL IEP: 159 MG/DL
KAPPA LC CSF-MCNC: 2.63 MG/DL
KAPPA LC SERPL-MCNC: 3.78 MG/DL
MCHC RBC-ENTMCNC: 28.9 PG
MCHC RBC-ENTMCNC: 34.3 GM/DL
MCV RBC AUTO: 84.4 FL
PLATELET # BLD AUTO: 315 K/UL
POTASSIUM SERPL-SCNC: 4.3 MMOL/L
PROT SERPL-MCNC: 7.8 G/DL
RBC # BLD: 5.08 M/UL
RBC # FLD: 14.4 %
SODIUM SERPL-SCNC: 136 MMOL/L
VIT B12 SERPL-MCNC: 254 PG/ML
WBC # FLD AUTO: 6.89 K/UL

## 2024-01-23 ENCOUNTER — OUTPATIENT (OUTPATIENT)
Dept: OUTPATIENT SERVICES | Facility: HOSPITAL | Age: 74
LOS: 1 days | Discharge: HOME | End: 2024-01-23

## 2024-01-23 DIAGNOSIS — R79.9 ABNORMAL FINDING OF BLOOD CHEMISTRY, UNSPECIFIED: ICD-10-CM

## 2024-01-23 DIAGNOSIS — Z98.890 OTHER SPECIFIED POSTPROCEDURAL STATES: Chronic | ICD-10-CM

## 2024-01-23 RX ORDER — BECLOMETHASONE DIPROPIONATE HFA 80 UG/1
80 AEROSOL, METERED RESPIRATORY (INHALATION)
Qty: 1 | Refills: 2 | Status: DISCONTINUED | COMMUNITY
Start: 2022-10-11 | End: 2024-01-23

## 2024-01-23 RX ORDER — FLUTICASONE FUROATE 50 UG/1
50 POWDER RESPIRATORY (INHALATION)
Qty: 1 | Refills: 0 | Status: ACTIVE | COMMUNITY
Start: 2024-01-23 | End: 1900-01-01

## 2024-01-24 ENCOUNTER — LABORATORY RESULT (OUTPATIENT)
Age: 74
End: 2024-01-24

## 2024-01-24 ENCOUNTER — RESULT REVIEW (OUTPATIENT)
Age: 74
End: 2024-01-24

## 2024-01-24 ENCOUNTER — APPOINTMENT (OUTPATIENT)
Dept: HEMATOLOGY ONCOLOGY | Facility: CLINIC | Age: 74
End: 2024-01-24
Payer: MEDICARE

## 2024-01-24 VITALS
WEIGHT: 203.92 LBS | DIASTOLIC BLOOD PRESSURE: 72 MMHG | BODY MASS INDEX: 32.77 KG/M2 | OXYGEN SATURATION: 95 % | HEIGHT: 66 IN | HEART RATE: 61 BPM | SYSTOLIC BLOOD PRESSURE: 132 MMHG

## 2024-01-24 LAB
BASOPHILS # BLD AUTO: 0.2 K/UL — SIGNIFICANT CHANGE UP (ref 0–0.2)
BASOPHILS NFR BLD AUTO: 2.2 % — HIGH (ref 0–2)
EOSINOPHIL # BLD AUTO: 0.5 K/UL — SIGNIFICANT CHANGE UP (ref 0–0.5)
EOSINOPHIL NFR BLD AUTO: 6.8 % — HIGH (ref 0–6)
HCT VFR BLD CALC: 42.8 % — SIGNIFICANT CHANGE UP (ref 34.5–45)
HGB BLD-MCNC: 14.7 G/DL — SIGNIFICANT CHANGE UP (ref 11.5–15.5)
LYMPHOCYTES # BLD AUTO: 1.2 K/UL — SIGNIFICANT CHANGE UP (ref 1–3.3)
LYMPHOCYTES # BLD AUTO: 16.9 % — SIGNIFICANT CHANGE UP (ref 13–44)
MCHC RBC-ENTMCNC: 28.8 PG — SIGNIFICANT CHANGE UP (ref 27–34)
MCHC RBC-ENTMCNC: 34.3 G/DL — SIGNIFICANT CHANGE UP (ref 32–36)
MCV RBC AUTO: 84 FL — SIGNIFICANT CHANGE UP (ref 80–100)
MONOCYTES # BLD AUTO: 0.4 K/UL — SIGNIFICANT CHANGE UP (ref 0–0.9)
MONOCYTES NFR BLD AUTO: 5.9 % — SIGNIFICANT CHANGE UP (ref 2–14)
NEUTROPHILS # BLD AUTO: 4.8 K/UL — SIGNIFICANT CHANGE UP (ref 1.8–7.4)
NEUTROPHILS NFR BLD AUTO: 68.2 % — SIGNIFICANT CHANGE UP (ref 43–77)
PLATELET # BLD AUTO: 332 K/UL — SIGNIFICANT CHANGE UP (ref 150–400)
RBC # BLD: 5.1 M/UL — SIGNIFICANT CHANGE UP (ref 3.8–5.2)
RBC # FLD: 13 % — SIGNIFICANT CHANGE UP (ref 10.3–14.5)
WBC # BLD: 7 K/UL — SIGNIFICANT CHANGE UP (ref 3.8–10.5)
WBC # FLD AUTO: 7 K/UL — SIGNIFICANT CHANGE UP (ref 3.8–10.5)

## 2024-01-24 PROCEDURE — 99205 OFFICE O/P NEW HI 60 MIN: CPT

## 2024-01-25 NOTE — ASSESSMENT
[FreeTextEntry1] : Patient is a 73 F referred for easy bruising and abnormal lab results.  #Easy bruising #Elevated FLC #Elevated Alk phosph - Sending PT/INR, PTT, mixing study, vwF panel, Factor 8, 9, 11, 12, Lupus anticoagulant - Repeat CMP and immunoelectrophoresis to assess for MGUS. Pending results, will see if patient needs imaging or bone marrow biopsy - Elevated Alk phosph with normal GGT, likely indicates increased bone turnover vs bony lesions. May need skeletal survey or bone scan  Will call patient with results and schedule follow-up as needed

## 2024-01-25 NOTE — HISTORY OF PRESENT ILLNESS
[de-identified] : Patient is a 73 F referred for easy bruising and abnormal lab results.  Patient states in the last 6 months, noticed increased bruising on her upper and lower extremities, both with trauma and spontaneous. Patient at first attributed the bruising to her aspirin use but notices it has increased in frequency and severity. Concerned she has hematomas forming on her hand spontaneously with stripping of skin and bleeding of the wound.   Denies previous use of blood transfusion, hemarthrosis, nose bleeds, bleeding with dental extractions. No FHx of bruising or excess bleeding or thrombosis or hematologic malignancy.     Hx of heavy menses and iron deficiency in her youth. Mild bleeding with brushing her gums.   Presented to her PCP with labs showing elevated Immunoglobulins Kappa FLC at 3.78.   Denies weight loss, fever, palpable LAD, night sweats, abdominal pain, N/V.

## 2024-01-25 NOTE — PHYSICAL EXAM
[Normal] : no peripheral adenopathy appreciated [de-identified] : faded bruising on upper and lower extremities

## 2024-01-30 ENCOUNTER — NON-APPOINTMENT (OUTPATIENT)
Age: 74
End: 2024-01-30

## 2024-01-31 DIAGNOSIS — D64.9 ANEMIA, UNSPECIFIED: ICD-10-CM

## 2024-01-31 LAB
ALBUMIN MFR SERPL ELPH: 51.4 %
ALBUMIN SERPL ELPH-MCNC: 4.5 G/DL
ALBUMIN SERPL-MCNC: 4.2 G/DL
ALBUMIN/GLOB SERPL: 1.1 RATIO
ALP BLD-CCNC: 170 U/L
ALPHA1 GLOB MFR SERPL ELPH: 5.2 %
ALPHA1 GLOB SERPL ELPH-MCNC: 0.4 G/DL
ALPHA2 GLOB MFR SERPL ELPH: 13.5 %
ALPHA2 GLOB SERPL ELPH-MCNC: 1.1 G/DL
ALT SERPL-CCNC: 19 U/L
ANION GAP SERPL CALC-SCNC: 15 MMOL/L
APTT BLD: 31.1 SEC
AST SERPL-CCNC: 19 U/L
B-GLOBULIN MFR SERPL ELPH: 14.1 %
B-GLOBULIN SERPL ELPH-MCNC: 1.1 G/DL
BILIRUB SERPL-MCNC: 0.5 MG/DL
BUN SERPL-MCNC: 22 MG/DL
CALCIUM SERPL-MCNC: 9.8 MG/DL
CHLORIDE SERPL-SCNC: 97 MMOL/L
CO2 SERPL-SCNC: 23 MMOL/L
CREAT SERPL-MCNC: 0.94 MG/DL
DEPRECATED KAPPA LC FREE/LAMBDA SER: 1.48 RATIO
EGFR: 64 ML/MIN/1.73M2
FACT IX ACT/NOR PPP: 152 %
FACT VIII ACT/NOR PPP: 209 %
FACT XI ACT/NOR PPP: 163 %
FACT XII PPP CHRO-ACNC: 132 %
GAMMA GLOB FLD ELPH-MCNC: 1.3 G/DL
GAMMA GLOB MFR SERPL ELPH: 15.8 %
GLUCOSE SERPL-MCNC: 134 MG/DL
HOMOCYSTEINE LEVEL: 20 UMOL/L
IGA SER QL IEP: 375 MG/DL
IGG SER QL IEP: 867 MG/DL
IGM SER QL IEP: 137 MG/DL
INR PPP: 0.87 RATIO
INTERPRETATION SERPL IEP-IMP: NORMAL
KAPPA LC CSF-MCNC: 2.12 MG/DL
KAPPA LC SERPL-MCNC: 3.13 MG/DL
M PROTEIN SPEC IFE-MCNC: NORMAL
METHYLMALONATE SERPL-SCNC: 460 NMOL/L
POTASSIUM SERPL-SCNC: 3.7 MMOL/L
PROT SERPL-MCNC: 8.1 G/DL
PT BLD: 10 SEC
SODIUM SERPL-SCNC: 135 MMOL/L
VIT C SERPL-MCNC: 1.4 MG/DL

## 2024-01-31 RX ORDER — CYANOCOBALAMIN (VITAMIN B-12) 1000 MCG
1000 TABLET ORAL DAILY
Qty: 30 | Refills: 3 | Status: ACTIVE | COMMUNITY
Start: 2024-01-31 | End: 1900-01-01

## 2024-02-05 ENCOUNTER — RX RENEWAL (OUTPATIENT)
Age: 74
End: 2024-02-05

## 2024-02-06 ENCOUNTER — TRANSCRIPTION ENCOUNTER (OUTPATIENT)
Age: 74
End: 2024-02-06

## 2024-02-09 ENCOUNTER — RX RENEWAL (OUTPATIENT)
Age: 74
End: 2024-02-09

## 2024-02-09 RX ORDER — ATORVASTATIN CALCIUM 40 MG/1
40 TABLET, FILM COATED ORAL
Qty: 90 | Refills: 1 | Status: ACTIVE | COMMUNITY
Start: 2021-12-27 | End: 1900-01-01

## 2024-02-23 ENCOUNTER — APPOINTMENT (OUTPATIENT)
Dept: CARDIOLOGY | Facility: CLINIC | Age: 74
End: 2024-02-23

## 2024-02-27 ENCOUNTER — RX RENEWAL (OUTPATIENT)
Age: 74
End: 2024-02-27

## 2024-04-01 ENCOUNTER — TRANSCRIPTION ENCOUNTER (OUTPATIENT)
Age: 74
End: 2024-04-01

## 2024-04-08 ENCOUNTER — RX RENEWAL (OUTPATIENT)
Age: 74
End: 2024-04-08

## 2024-04-08 RX ORDER — EMPAGLIFLOZIN 10 MG/1
10 TABLET, FILM COATED ORAL
Qty: 90 | Refills: 1 | Status: ACTIVE | COMMUNITY
Start: 2024-04-08 | End: 1900-01-01

## 2024-04-16 ENCOUNTER — TRANSCRIPTION ENCOUNTER (OUTPATIENT)
Age: 74
End: 2024-04-16

## 2024-04-16 RX ORDER — HYDROCHLOROTHIAZIDE 25 MG/1
25 TABLET ORAL
Qty: 90 | Refills: 1 | Status: ACTIVE | COMMUNITY
Start: 2020-01-02 | End: 1900-01-01

## 2024-04-29 ENCOUNTER — TRANSCRIPTION ENCOUNTER (OUTPATIENT)
Age: 74
End: 2024-04-29

## 2024-05-05 ENCOUNTER — RX RENEWAL (OUTPATIENT)
Age: 74
End: 2024-05-05

## 2024-05-05 RX ORDER — LOSARTAN POTASSIUM AND HYDROCHLOROTHIAZIDE 25; 100 MG/1; MG/1
100-25 TABLET ORAL
Qty: 90 | Refills: 1 | Status: ACTIVE | COMMUNITY
Start: 2021-04-04 | End: 1900-01-01

## 2024-05-07 ENCOUNTER — OUTPATIENT (OUTPATIENT)
Dept: OUTPATIENT SERVICES | Facility: HOSPITAL | Age: 74
LOS: 1 days | Discharge: HOME | End: 2024-05-07

## 2024-05-07 DIAGNOSIS — Z98.890 OTHER SPECIFIED POSTPROCEDURAL STATES: Chronic | ICD-10-CM

## 2024-05-07 DIAGNOSIS — R92.8 OTHER ABNORMAL AND INCONCLUSIVE FINDINGS ON DIAGNOSTIC IMAGING OF BREAST: ICD-10-CM

## 2024-05-07 DIAGNOSIS — R79.9 ABNORMAL FINDING OF BLOOD CHEMISTRY, UNSPECIFIED: ICD-10-CM

## 2024-05-09 ENCOUNTER — APPOINTMENT (OUTPATIENT)
Dept: FAMILY MEDICINE | Facility: CLINIC | Age: 74
End: 2024-05-09
Payer: MEDICARE

## 2024-05-09 VITALS
SYSTOLIC BLOOD PRESSURE: 124 MMHG | RESPIRATION RATE: 14 BRPM | BODY MASS INDEX: 31.34 KG/M2 | WEIGHT: 195 LBS | OXYGEN SATURATION: 96 % | DIASTOLIC BLOOD PRESSURE: 84 MMHG | HEIGHT: 66 IN | HEART RATE: 62 BPM

## 2024-05-09 DIAGNOSIS — E78.5 HYPERLIPIDEMIA, UNSPECIFIED: ICD-10-CM

## 2024-05-09 DIAGNOSIS — I10 ESSENTIAL (PRIMARY) HYPERTENSION: ICD-10-CM

## 2024-05-09 DIAGNOSIS — E11.9 TYPE 2 DIABETES MELLITUS W/OUT COMPLICATIONS: ICD-10-CM

## 2024-05-09 PROCEDURE — G2211 COMPLEX E/M VISIT ADD ON: CPT

## 2024-05-09 PROCEDURE — 99214 OFFICE O/P EST MOD 30 MIN: CPT

## 2024-05-09 PROCEDURE — 83036 HEMOGLOBIN GLYCOSYLATED A1C: CPT | Mod: QW

## 2024-05-09 NOTE — HISTORY OF PRESENT ILLNESS
[FreeTextEntry1] : F/u  [de-identified] : 74, pmhx DM-2 on metformin and jardiance well control, HTN on amlodipine, Losartan, Hctz, metoprolol, Dyslipidemia on simvastatin, obesity. Long hx vertigo been well controlled since vestibular therapy. Here for f/u Pt doing well c/o recurrent easy bruising with no trauma or injury. Seen by hematology> R/O MGUS.

## 2024-05-09 NOTE — ASSESSMENT
[FreeTextEntry1] : -CPE: 08/2021> advise to schedule -HIV/HC: refused. Mammo Nov 2023> BIRAD 3> f/up in 6 months -Colonoscopy: 08/2020. Was told that she does not need to have any further testing since she is >70 years old. -Gyn: pt will schedule appointment w/ Gyn. -Bone scan: 08/22: osteopenia  # -HTN: well control. Continue meds. On amlodipine, Losartan, HCTZ, Metoprolol  #-DM: -HbA1c: 6.7%.---> 8.1%---> 7.3% on metformin to 500mg BID on Jardiance 25 mg  On Atorvastatin Glucose monitoring done once a day.   Immunizations: -FLU  11/2023  # Elevated ALKP> most likely due to increase bone turnover -Seen by GI in the past and Hematology  # easy bruising> R/O MGUS Evaluated by hematology

## 2024-05-09 NOTE — HEALTH RISK ASSESSMENT
[No] : In the past 12 months have you used drugs other than those required for medical reasons? No [Never] : Never [de-identified] : Ophthalmology

## 2024-05-11 DIAGNOSIS — R76.8 OTHER SPECIFIED ABNORMAL IMMUNOLOGICAL FINDINGS IN SERUM: ICD-10-CM

## 2024-05-13 ENCOUNTER — RESULT REVIEW (OUTPATIENT)
Age: 74
End: 2024-05-13

## 2024-05-13 ENCOUNTER — APPOINTMENT (OUTPATIENT)
Dept: HEMATOLOGY ONCOLOGY | Facility: CLINIC | Age: 74
End: 2024-05-13
Payer: MEDICARE

## 2024-05-13 VITALS
SYSTOLIC BLOOD PRESSURE: 130 MMHG | HEIGHT: 66 IN | TEMPERATURE: 97.2 F | OXYGEN SATURATION: 97 % | BODY MASS INDEX: 31.34 KG/M2 | DIASTOLIC BLOOD PRESSURE: 75 MMHG | WEIGHT: 195 LBS | HEART RATE: 59 BPM

## 2024-05-13 DIAGNOSIS — R23.3 SPONTANEOUS ECCHYMOSES: ICD-10-CM

## 2024-05-13 DIAGNOSIS — E53.8 DEFICIENCY OF OTHER SPECIFIED B GROUP VITAMINS: ICD-10-CM

## 2024-05-13 LAB
BASOPHILS # BLD AUTO: 0.1 K/UL — SIGNIFICANT CHANGE UP (ref 0–0.2)
BASOPHILS NFR BLD AUTO: 0.9 % — SIGNIFICANT CHANGE UP (ref 0–2)
EOSINOPHIL # BLD AUTO: 0.4 K/UL — SIGNIFICANT CHANGE UP (ref 0–0.5)
EOSINOPHIL NFR BLD AUTO: 4.8 % — SIGNIFICANT CHANGE UP (ref 0–6)
HCT VFR BLD CALC: 42.8 % — SIGNIFICANT CHANGE UP (ref 34.5–45)
HGB BLD-MCNC: 14.2 G/DL — SIGNIFICANT CHANGE UP (ref 11.5–15.5)
LYMPHOCYTES # BLD AUTO: 1.4 K/UL — SIGNIFICANT CHANGE UP (ref 1–3.3)
LYMPHOCYTES # BLD AUTO: 16.6 % — SIGNIFICANT CHANGE UP (ref 13–44)
MCHC RBC-ENTMCNC: 28.1 PG — SIGNIFICANT CHANGE UP (ref 27–34)
MCHC RBC-ENTMCNC: 33.1 G/DL — SIGNIFICANT CHANGE UP (ref 32–36)
MCV RBC AUTO: 84.8 FL — SIGNIFICANT CHANGE UP (ref 80–100)
MONOCYTES # BLD AUTO: 0.5 K/UL — SIGNIFICANT CHANGE UP (ref 0–0.9)
MONOCYTES NFR BLD AUTO: 5.8 % — SIGNIFICANT CHANGE UP (ref 2–14)
NEUTROPHILS # BLD AUTO: 6.1 K/UL — SIGNIFICANT CHANGE UP (ref 1.8–7.4)
NEUTROPHILS NFR BLD AUTO: 71.9 % — SIGNIFICANT CHANGE UP (ref 43–77)
PLATELET # BLD AUTO: 330 K/UL — SIGNIFICANT CHANGE UP (ref 150–400)
RBC # BLD: 5.05 M/UL — SIGNIFICANT CHANGE UP (ref 3.8–5.2)
RBC # FLD: 13 % — SIGNIFICANT CHANGE UP (ref 10.3–14.5)
WBC # BLD: 8.5 K/UL — SIGNIFICANT CHANGE UP (ref 3.8–10.5)
WBC # FLD AUTO: 8.5 K/UL — SIGNIFICANT CHANGE UP (ref 3.8–10.5)

## 2024-05-13 PROCEDURE — 99214 OFFICE O/P EST MOD 30 MIN: CPT

## 2024-05-13 NOTE — HISTORY OF PRESENT ILLNESS
[de-identified] : Patient is a 74 F referred for easy bruising and abnormal lab results.  Patient states in the last 6 months, noticed increased bruising on her upper and lower extremities, both with trauma and spontaneous. Patient at first attributed the bruising to her aspirin use but notices it has increased in frequency and severity. Concerned she has hematomas forming on her hand spontaneously with stripping of skin and bleeding of the wound.  Denies previous use of blood transfusion, hemarthrosis, nose bleeds, bleeding with dental extractions. No FHx of bruising or excess bleeding or thrombosis or hematologic malignancy. Hx of heavy menses and iron deficiency in her youth. Mild bleeding with brushing her gums.  Presented to her PCP with labs showing elevated Immunoglobulins Kappa FLC at 3.78.  Interval History: Started on B12 supplement, adherent. Continues to get bruising on her arms and abdomen, mild. No excessive bleeding. Denies weight loss, fever, palpable LAD, night sweats, abdominal pain, N/V. No bone pain, constipation.

## 2024-05-13 NOTE — ASSESSMENT
[FreeTextEntry1] : Patient is a 73 F referred for easy bruising and abnormal lab results.  #Easy bruising #Elevated FLC #Elevated Alk phosph #B12 Deficiency - PT/INR, PTT, mixing study, vwF panel, Factor 8, 9, 11, 12, Lupus anticoagulant, Vitamin C within normal limits. Brusing likely related to senile purpura, thinning of skin.  - Repeat CMP and immunoelectrophoresis to assess for MGUS. No M-spike with normal electrophoresis, elevated FLC kappa with normal ratio, not meeting criteria for MGUS - Elevated Alk phosph with normal GGT, likely indicates increased bone turnover vs bony lesions. Largely normal immunoelectrophoresis, MGUS lower on differential. Likely related to known osteopenia - Low normal B 12 with elevated homocysteine and MMA --> B12 deficiency. Patient to continue oral B12. If remains deficient, will consider IM administration and testing for pernicious anemia

## 2024-05-14 LAB
ALBUMIN SERPL ELPH-MCNC: 4.4 G/DL
ALP BLD-CCNC: 180 U/L
ALT SERPL-CCNC: 16 U/L
ANION GAP SERPL CALC-SCNC: 14 MMOL/L
AST SERPL-CCNC: 22 U/L
BILIRUB SERPL-MCNC: 0.3 MG/DL
BUN SERPL-MCNC: 17 MG/DL
CALCIUM SERPL-MCNC: 10.2 MG/DL
CHLORIDE SERPL-SCNC: 94 MMOL/L
CO2 SERPL-SCNC: 26 MMOL/L
CREAT SERPL-MCNC: 0.91 MG/DL
EGFR: 66 ML/MIN/1.73M2
FOLATE SERPL-MCNC: 11.9 NG/ML
GLUCOSE SERPL-MCNC: 175 MG/DL
POTASSIUM SERPL-SCNC: 3.8 MMOL/L
PROT SERPL-MCNC: 7.4 G/DL
SODIUM SERPL-SCNC: 135 MMOL/L
VIT B12 SERPL-MCNC: 1477 PG/ML

## 2024-05-20 LAB
ALBUMIN MFR SERPL ELPH: 51.4 %
ALBUMIN SERPL-MCNC: 3.8 G/DL
ALBUMIN/GLOB SERPL: 1.1 RATIO
ALPHA1 GLOB MFR SERPL ELPH: 5.7 %
ALPHA1 GLOB SERPL ELPH-MCNC: 0.4 G/DL
ALPHA2 GLOB MFR SERPL ELPH: 13.7 %
ALPHA2 GLOB SERPL ELPH-MCNC: 1 G/DL
B-GLOBULIN MFR SERPL ELPH: 14.1 %
B-GLOBULIN SERPL ELPH-MCNC: 1 G/DL
DEPRECATED KAPPA LC FREE/LAMBDA SER: 1.27 RATIO
GAMMA GLOB FLD ELPH-MCNC: 1.1 G/DL
GAMMA GLOB MFR SERPL ELPH: 15.1 %
HOMOCYSTEINE LEVEL: 11.2 UMOL/L
IGA SER QL IEP: 412 MG/DL
IGG SER QL IEP: 1074 MG/DL
IGM SER QL IEP: 131 MG/DL
INTERPRETATION SERPL IEP-IMP: NORMAL
KAPPA LC CSF-MCNC: 2.51 MG/DL
KAPPA LC SERPL-MCNC: 3.18 MG/DL
M PROTEIN SPEC IFE-MCNC: NORMAL
METHYLMALONATE SERPL-SCNC: 258 NMOL/L
PROT SERPL-MCNC: 7.4 G/DL
PROT SERPL-MCNC: 7.4 G/DL

## 2024-05-28 ENCOUNTER — RX RENEWAL (OUTPATIENT)
Age: 74
End: 2024-05-28

## 2024-05-28 RX ORDER — METOPROLOL TARTRATE 25 MG/1
25 TABLET, FILM COATED ORAL
Qty: 180 | Refills: 0 | Status: ACTIVE | COMMUNITY
Start: 2017-03-15 | End: 1900-01-01

## 2024-06-04 ENCOUNTER — TRANSCRIPTION ENCOUNTER (OUTPATIENT)
Age: 74
End: 2024-06-04

## 2024-06-05 ENCOUNTER — TRANSCRIPTION ENCOUNTER (OUTPATIENT)
Age: 74
End: 2024-06-05

## 2024-07-01 ENCOUNTER — RX RENEWAL (OUTPATIENT)
Age: 74
End: 2024-07-01

## 2024-08-11 ENCOUNTER — RESULT CHARGE (OUTPATIENT)
Age: 74
End: 2024-08-11

## 2024-08-12 ENCOUNTER — RX RENEWAL (OUTPATIENT)
Age: 74
End: 2024-08-12

## 2024-08-12 ENCOUNTER — NON-APPOINTMENT (OUTPATIENT)
Age: 74
End: 2024-08-12

## 2024-08-12 DIAGNOSIS — Z01.818 ENCOUNTER FOR OTHER PREPROCEDURAL EXAMINATION: ICD-10-CM

## 2024-08-13 ENCOUNTER — NON-APPOINTMENT (OUTPATIENT)
Age: 74
End: 2024-08-13

## 2024-08-13 ENCOUNTER — APPOINTMENT (OUTPATIENT)
Dept: FAMILY MEDICINE | Facility: CLINIC | Age: 74
End: 2024-08-13
Payer: MEDICARE

## 2024-08-13 VITALS
TEMPERATURE: 97.2 F | HEIGHT: 66 IN | BODY MASS INDEX: 31.02 KG/M2 | OXYGEN SATURATION: 95 % | HEART RATE: 70 BPM | RESPIRATION RATE: 14 BRPM | WEIGHT: 193 LBS | DIASTOLIC BLOOD PRESSURE: 70 MMHG | SYSTOLIC BLOOD PRESSURE: 124 MMHG

## 2024-08-13 DIAGNOSIS — E87.1 HYPO-OSMOLALITY AND HYPONATREMIA: ICD-10-CM

## 2024-08-13 DIAGNOSIS — E87.6 HYPOKALEMIA: ICD-10-CM

## 2024-08-13 DIAGNOSIS — Z01.818 ENCOUNTER FOR OTHER PREPROCEDURAL EXAMINATION: ICD-10-CM

## 2024-08-13 DIAGNOSIS — H01.011: ICD-10-CM

## 2024-08-13 DIAGNOSIS — H01.014: ICD-10-CM

## 2024-08-13 LAB
ANION GAP SERPL CALC-SCNC: 18 MMOL/L
BUN SERPL-MCNC: 20 MG/DL
CALCIUM SERPL-MCNC: 10 MG/DL
CHLORIDE SERPL-SCNC: 91 MMOL/L
CO2 SERPL-SCNC: 20 MMOL/L
CREAT SERPL-MCNC: 0.98 MG/DL
EGFR: 60 ML/MIN/1.73M2
GLUCOSE SERPL-MCNC: 148 MG/DL
POTASSIUM SERPL-SCNC: 3.6 MMOL/L
SODIUM SERPL-SCNC: 129 MMOL/L

## 2024-08-13 PROCEDURE — 36415 COLL VENOUS BLD VENIPUNCTURE: CPT

## 2024-08-13 PROCEDURE — 99213 OFFICE O/P EST LOW 20 MIN: CPT

## 2024-08-13 PROCEDURE — 83036 HEMOGLOBIN GLYCOSYLATED A1C: CPT | Mod: QW

## 2024-08-13 PROCEDURE — 93000 ELECTROCARDIOGRAM COMPLETE: CPT

## 2024-08-13 RX ORDER — NORMAL SALT TABLETS 1 G/G
1 TABLET ORAL DAILY
Qty: 1 | Refills: 0 | Status: ACTIVE | COMMUNITY
Start: 2024-08-13 | End: 1900-01-01

## 2024-08-13 RX ORDER — POTASSIUM CHLORIDE 1500 MG/1
20 TABLET, FILM COATED, EXTENDED RELEASE ORAL
Qty: 3 | Refills: 0 | Status: ACTIVE | COMMUNITY
Start: 2024-08-13 | End: 1900-01-01

## 2024-08-15 LAB
ALBUMIN SERPL ELPH-MCNC: 4.5 G/DL
ALP BLD-CCNC: 149 U/L
ALT SERPL-CCNC: 19 U/L
ANION GAP SERPL CALC-SCNC: 16 MMOL/L
APPEARANCE: CLEAR
AST SERPL-CCNC: 24 U/L
BACTERIA: NEGATIVE /HPF
BASOPHILS # BLD AUTO: 0.04 K/UL
BASOPHILS NFR BLD AUTO: 0.6 %
BILIRUB SERPL-MCNC: 0.8 MG/DL
BILIRUBIN URINE: NEGATIVE
BLOOD URINE: NEGATIVE
BUN SERPL-MCNC: 17 MG/DL
CALCIUM SERPL-MCNC: 10 MG/DL
CAST: 0 /LPF
CHLORIDE SERPL-SCNC: 89 MMOL/L
CO2 SERPL-SCNC: 23 MMOL/L
COLOR: YELLOW
CREAT SERPL-MCNC: 0.97 MG/DL
EGFR: 61 ML/MIN/1.73M2
EOSINOPHIL # BLD AUTO: 0.14 K/UL
EOSINOPHIL NFR BLD AUTO: 1.9 %
EPITHELIAL CELLS: 0 /HPF
GLUCOSE QUALITATIVE U: >=1000 MG/DL
GLUCOSE SERPL-MCNC: 111 MG/DL
HCT VFR BLD CALC: 45 %
HGB BLD-MCNC: 14.3 G/DL
IMM GRANULOCYTES NFR BLD AUTO: 0.3 %
INR PPP: 0.95 RATIO
KETONES URINE: NEGATIVE MG/DL
LEUKOCYTE ESTERASE URINE: ABNORMAL
LYMPHOCYTES # BLD AUTO: 1.3 K/UL
LYMPHOCYTES NFR BLD AUTO: 18.1 %
MAN DIFF?: NORMAL
MCHC RBC-ENTMCNC: 27.8 PG
MCHC RBC-ENTMCNC: 31.8 GM/DL
MCV RBC AUTO: 87.4 FL
MICROSCOPIC-UA: NORMAL
MONOCYTES # BLD AUTO: 0.5 K/UL
MONOCYTES NFR BLD AUTO: 6.9 %
NEUTROPHILS # BLD AUTO: 5.2 K/UL
NEUTROPHILS NFR BLD AUTO: 72.2 %
NITRITE URINE: NEGATIVE
PH URINE: 7
PLATELET # BLD AUTO: 243 K/UL
POTASSIUM SERPL-SCNC: 3.3 MMOL/L
PROT SERPL-MCNC: 7.6 G/DL
PROTEIN URINE: NEGATIVE MG/DL
PT BLD: 10.7 SEC
RBC # BLD: 5.15 M/UL
RBC # FLD: 14.5 %
RED BLOOD CELLS URINE: 1 /HPF
SODIUM SERPL-SCNC: 128 MMOL/L
SPECIFIC GRAVITY URINE: 1.02
UROBILINOGEN URINE: 1 MG/DL
WBC # FLD AUTO: 7.2 K/UL
WHITE BLOOD CELLS URINE: 0 /HPF

## 2024-08-16 ENCOUNTER — APPOINTMENT (OUTPATIENT)
Dept: FAMILY MEDICINE | Facility: CLINIC | Age: 74
End: 2024-08-16

## 2024-08-16 NOTE — PHYSICAL EXAM
[No Acute Distress] : no acute distress [Well Nourished] : well nourished [Well Developed] : well developed [Well-Appearing] : well-appearing [Normal Sclera/Conjunctiva] : normal sclera/conjunctiva [PERRL] : pupils equal round and reactive to light [EOMI] : extraocular movements intact [Normal Outer Ear/Nose] : the outer ears and nose were normal in appearance [Normal Oropharynx] : the oropharynx was normal [No JVD] : no jugular venous distention [No Lymphadenopathy] : no lymphadenopathy [Supple] : supple [Thyroid Normal, No Nodules] : the thyroid was normal and there were no nodules present [No Respiratory Distress] : no respiratory distress  [No Accessory Muscle Use] : no accessory muscle use [Clear to Auscultation] : lungs were clear to auscultation bilaterally [Normal Rate] : normal rate  [Regular Rhythm] : with a regular rhythm [Normal S1, S2] : normal S1 and S2 [No Murmur] : no murmur heard [No Carotid Bruits] : no carotid bruits [No Abdominal Bruit] : a ~M bruit was not heard ~T in the abdomen [No Varicosities] : no varicosities [Pedal Pulses Present] : the pedal pulses are present [No Edema] : there was no peripheral edema [No Palpable Aorta] : no palpable aorta [No Extremity Clubbing/Cyanosis] : no extremity clubbing/cyanosis [Soft] : abdomen soft [Non Tender] : non-tender [Non-distended] : non-distended [No Masses] : no abdominal mass palpated [No HSM] : no HSM [Normal Bowel Sounds] : normal bowel sounds [Normal Posterior Cervical Nodes] : no posterior cervical lymphadenopathy [Normal Anterior Cervical Nodes] : no anterior cervical lymphadenopathy [No CVA Tenderness] : no CVA  tenderness [No Spinal Tenderness] : no spinal tenderness [No Joint Swelling] : no joint swelling [Grossly Normal Strength/Tone] : grossly normal strength/tone [No Rash] : no rash [Coordination Grossly Intact] : coordination grossly intact [No Focal Deficits] : no focal deficits [Normal Gait] : normal gait [Deep Tendon Reflexes (DTR)] : deep tendon reflexes were 2+ and symmetric [Normal Affect] : the affect was normal [Normal Insight/Judgement] : insight and judgment were intact [de-identified] : bilateral upper eyelid blepharitis

## 2024-08-16 NOTE — RESULTS/DATA
[] : results reviewed [de-identified] : wnl [de-identified] : Hypokalemia and hyponatremia [de-identified] : 1st degree AVB [de-identified] : A1c 7.0

## 2024-08-16 NOTE — HISTORY OF PRESENT ILLNESS
[Diabetes] : diabetes [No Pertinent Cardiac History] : no history of aortic stenosis, atrial fibrillation, coronary artery disease, recent myocardial infarction, or implantable device/pacemaker [Asthma] : asthma [No Adverse Anesthesia Reaction] : no adverse anesthesia reaction in self or family member [(Patient denies any chest pain, claudication, dyspnea on exertion, orthopnea, palpitations or syncope)] : Patient denies any chest pain, claudication, dyspnea on exertion, orthopnea, palpitations or syncope [Anti-Platelet Agents: _____] : Anti-Platelet Agents: [unfilled] [Aortic Stenosis] : no aortic stenosis [Atrial Fibrillation] : no atrial fibrillation [Coronary Artery Disease] : no coronary artery disease [Recent Myocardial Infarction] : no recent myocardial infarction [Implantable Device/Pacemaker] : no implantable device/pacemaker [COPD] : no COPD [Sleep Apnea] : no sleep apnea [Smoker] : not a smoker [Chronic Anticoagulation] : no chronic anticoagulation [Chronic Kidney Disease] : no chronic kidney disease [FreeTextEntry1] : BILATERAL UPPER EYELID BLEPHAROPLASTY [FreeTextEntry2] : 8/14/24 [FreeTextEntry3] : Dr Thurston

## 2024-08-16 NOTE — ASSESSMENT
[High Risk Surgery - Intraperitoneal, Intrathoracic or Supringuinal Vascular Procedures] : High Risk Surgery - Intraperitoneal, Intrathoracic or Supringuinal Vascular Procedures - No (0) [Ischemic Heart Disease] : Ischemic Heart Disease - No (0) [Congestive Heart Failure] : Congestive Heart Failure - No (0) [Prior Cerebrovascular Accident or TIA] : Prior Cerebrovascular Accident or TIA - No (0) [Creatinine >= 2mg/dL (1 Point)] : Creatinine >= 2mg/dL - No (0) [Insulin-dependent Diabetic (1 Point)] : Insulin-dependent Diabetic - No (0) [0] : 0 , RCRI Class: I, Risk of Post-Op Cardiac Complications: 3.9%, 95% CI for Risk Estimate: 2.8% - 5.4% [Patient Optimized for Surgery] : Patient optimized for surgery [No Further Testing Recommended] : no further testing recommended [As per surgery] : as per surgery [FreeTextEntry4] : Laboratory results reviewed; found with Hypokalemia and Hyponatremia, both being replenished prior to surgery. EKG reviewed; f/w 1 AVB, otherwise no acute changes. Pt is medically cleared for proposed procedure. [FreeTextEntry6] : Stop aspirin 1 week prior to procedure [FreeTextEntry7] : Take anti-hypertensive medication with small sip of water on the day of the procedure. Avoid NSAIDs starting 1 week prior to procedure , Do not take diabetes meds on the day of the procedure

## 2024-08-16 NOTE — RESULTS/DATA
[] : results reviewed [de-identified] : wnl [de-identified] : Hypokalemia and hyponatremia [de-identified] : 1st degree AVB [de-identified] : A1c 7.0

## 2024-08-16 NOTE — PHYSICAL EXAM
[No Acute Distress] : no acute distress [Well Nourished] : well nourished [Well Developed] : well developed [Well-Appearing] : well-appearing [Normal Sclera/Conjunctiva] : normal sclera/conjunctiva [PERRL] : pupils equal round and reactive to light [EOMI] : extraocular movements intact [Normal Outer Ear/Nose] : the outer ears and nose were normal in appearance [Normal Oropharynx] : the oropharynx was normal [No JVD] : no jugular venous distention [No Lymphadenopathy] : no lymphadenopathy [Supple] : supple [Thyroid Normal, No Nodules] : the thyroid was normal and there were no nodules present [No Respiratory Distress] : no respiratory distress  [No Accessory Muscle Use] : no accessory muscle use [Clear to Auscultation] : lungs were clear to auscultation bilaterally [Normal Rate] : normal rate  [Regular Rhythm] : with a regular rhythm [Normal S1, S2] : normal S1 and S2 [No Murmur] : no murmur heard [No Carotid Bruits] : no carotid bruits [No Abdominal Bruit] : a ~M bruit was not heard ~T in the abdomen [No Varicosities] : no varicosities [Pedal Pulses Present] : the pedal pulses are present [No Edema] : there was no peripheral edema [No Palpable Aorta] : no palpable aorta [No Extremity Clubbing/Cyanosis] : no extremity clubbing/cyanosis [Soft] : abdomen soft [Non Tender] : non-tender [Non-distended] : non-distended [No Masses] : no abdominal mass palpated [No HSM] : no HSM [Normal Bowel Sounds] : normal bowel sounds [Normal Posterior Cervical Nodes] : no posterior cervical lymphadenopathy [Normal Anterior Cervical Nodes] : no anterior cervical lymphadenopathy [No CVA Tenderness] : no CVA  tenderness [No Spinal Tenderness] : no spinal tenderness [No Joint Swelling] : no joint swelling [Grossly Normal Strength/Tone] : grossly normal strength/tone [No Rash] : no rash [Coordination Grossly Intact] : coordination grossly intact [No Focal Deficits] : no focal deficits [Normal Gait] : normal gait [Deep Tendon Reflexes (DTR)] : deep tendon reflexes were 2+ and symmetric [Normal Affect] : the affect was normal [Normal Insight/Judgement] : insight and judgment were intact [de-identified] : bilateral upper eyelid blepharitis

## 2024-08-16 NOTE — PLAN
[FreeTextEntry1] :    Medical clearance discussed with and reviewed by supervising attending Dr Beltran Walters MD.

## 2024-08-19 ENCOUNTER — TRANSCRIPTION ENCOUNTER (OUTPATIENT)
Age: 74
End: 2024-08-19

## 2024-08-23 ENCOUNTER — RX RENEWAL (OUTPATIENT)
Age: 74
End: 2024-08-23

## 2024-09-05 ENCOUNTER — NON-APPOINTMENT (OUTPATIENT)
Age: 74
End: 2024-09-05

## 2024-09-05 LAB
ANION GAP SERPL CALC-SCNC: 16 MMOL/L
BUN SERPL-MCNC: 18 MG/DL
CALCIUM SERPL-MCNC: 10.4 MG/DL
CHLORIDE SERPL-SCNC: 91 MMOL/L
CO2 SERPL-SCNC: 25 MMOL/L
CREAT SERPL-MCNC: 0.94 MG/DL
EGFR: 63 ML/MIN/1.73M2
GLUCOSE SERPL-MCNC: 115 MG/DL
POTASSIUM SERPL-SCNC: 4 MMOL/L
SODIUM SERPL-SCNC: 131 MMOL/L

## 2024-10-09 ENCOUNTER — APPOINTMENT (OUTPATIENT)
Dept: FAMILY MEDICINE | Facility: CLINIC | Age: 74
End: 2024-10-09
Payer: MEDICARE

## 2024-10-09 ENCOUNTER — LABORATORY RESULT (OUTPATIENT)
Age: 74
End: 2024-10-09

## 2024-10-09 VITALS
HEART RATE: 63 BPM | OXYGEN SATURATION: 95 % | RESPIRATION RATE: 14 BRPM | WEIGHT: 181 LBS | BODY MASS INDEX: 29.09 KG/M2 | HEIGHT: 66 IN | DIASTOLIC BLOOD PRESSURE: 66 MMHG | SYSTOLIC BLOOD PRESSURE: 130 MMHG

## 2024-10-09 DIAGNOSIS — I10 ESSENTIAL (PRIMARY) HYPERTENSION: ICD-10-CM

## 2024-10-09 DIAGNOSIS — E78.5 HYPERLIPIDEMIA, UNSPECIFIED: ICD-10-CM

## 2024-10-09 DIAGNOSIS — E87.1 HYPO-OSMOLALITY AND HYPONATREMIA: ICD-10-CM

## 2024-10-09 DIAGNOSIS — E11.9 TYPE 2 DIABETES MELLITUS W/OUT COMPLICATIONS: ICD-10-CM

## 2024-10-09 DIAGNOSIS — Z00.00 ENCOUNTER FOR GENERAL ADULT MEDICAL EXAMINATION W/OUT ABNORMAL FINDINGS: ICD-10-CM

## 2024-10-09 DIAGNOSIS — M85.80 OTHER SPECIFIED DISORDERS OF BONE DENSITY AND STRUCTURE, UNSPECIFIED SITE: ICD-10-CM

## 2024-10-09 PROCEDURE — G0439: CPT

## 2024-10-09 PROCEDURE — 36415 COLL VENOUS BLD VENIPUNCTURE: CPT

## 2024-10-09 PROCEDURE — 99214 OFFICE O/P EST MOD 30 MIN: CPT | Mod: 25

## 2024-10-11 ENCOUNTER — RX RENEWAL (OUTPATIENT)
Age: 74
End: 2024-10-11

## 2024-10-11 LAB
ANION GAP SERPL CALC-SCNC: 23 MMOL/L
BUN SERPL-MCNC: 18 MG/DL
CALCIUM SERPL-MCNC: 10.3 MG/DL
CHLORIDE SERPL-SCNC: 91 MMOL/L
CHOLEST SERPL-MCNC: 184 MG/DL
CO2 SERPL-SCNC: 18 MMOL/L
CREAT SERPL-MCNC: 0.95 MG/DL
EGFR: 62 ML/MIN/1.73M2
GLUCOSE SERPL-MCNC: 116 MG/DL
HDLC SERPL-MCNC: 79 MG/DL
LDLC SERPL CALC-MCNC: 91 MG/DL
NONHDLC SERPL-MCNC: 105 MG/DL
POTASSIUM SERPL-SCNC: 3.9 MMOL/L
SODIUM SERPL-SCNC: 133 MMOL/L
TRIGL SERPL-MCNC: 76 MG/DL

## 2024-11-06 ENCOUNTER — OUTPATIENT (OUTPATIENT)
Dept: OUTPATIENT SERVICES | Facility: HOSPITAL | Age: 74
LOS: 1 days | Discharge: HOME | End: 2024-11-06

## 2024-11-06 DIAGNOSIS — Z98.890 OTHER SPECIFIED POSTPROCEDURAL STATES: Chronic | ICD-10-CM

## 2024-11-06 DIAGNOSIS — R79.9 ABNORMAL FINDING OF BLOOD CHEMISTRY, UNSPECIFIED: ICD-10-CM

## 2024-11-07 ENCOUNTER — RX RENEWAL (OUTPATIENT)
Age: 74
End: 2024-11-07

## 2024-11-11 ENCOUNTER — NON-APPOINTMENT (OUTPATIENT)
Age: 74
End: 2024-11-11

## 2024-11-12 ENCOUNTER — APPOINTMENT (OUTPATIENT)
Dept: OTOLARYNGOLOGY | Facility: CLINIC | Age: 74
End: 2024-11-12
Payer: MEDICARE

## 2024-11-12 VITALS
BODY MASS INDEX: 28.93 KG/M2 | SYSTOLIC BLOOD PRESSURE: 116 MMHG | WEIGHT: 180 LBS | HEART RATE: 55 BPM | HEIGHT: 66 IN | DIASTOLIC BLOOD PRESSURE: 67 MMHG

## 2024-11-12 DIAGNOSIS — J01.90 ACUTE SINUSITIS, UNSPECIFIED: ICD-10-CM

## 2024-11-12 PROCEDURE — 99213 OFFICE O/P EST LOW 20 MIN: CPT

## 2024-11-12 RX ORDER — AMOXICILLIN AND CLAVULANATE POTASSIUM 875; 125 MG/1; MG/1
875-125 TABLET, COATED ORAL
Qty: 20 | Refills: 0 | Status: ACTIVE | COMMUNITY
Start: 2024-11-12 | End: 1900-01-01

## 2024-11-12 RX ORDER — FLUTICASONE PROPIONATE 50 UG/1
50 SPRAY, METERED NASAL
Qty: 1 | Refills: 5 | Status: ACTIVE | COMMUNITY
Start: 2024-11-12 | End: 1900-01-01

## 2024-11-13 ENCOUNTER — NON-APPOINTMENT (OUTPATIENT)
Age: 74
End: 2024-11-13

## 2024-11-13 ENCOUNTER — RESULT REVIEW (OUTPATIENT)
Age: 74
End: 2024-11-13

## 2024-11-13 ENCOUNTER — APPOINTMENT (OUTPATIENT)
Dept: HEMATOLOGY ONCOLOGY | Facility: CLINIC | Age: 74
End: 2024-11-13
Payer: MEDICARE

## 2024-11-13 VITALS
TEMPERATURE: 97.1 F | OXYGEN SATURATION: 95 % | SYSTOLIC BLOOD PRESSURE: 111 MMHG | BODY MASS INDEX: 28.93 KG/M2 | HEIGHT: 66 IN | DIASTOLIC BLOOD PRESSURE: 68 MMHG | WEIGHT: 180 LBS | HEART RATE: 59 BPM

## 2024-11-13 DIAGNOSIS — R76.8 OTHER SPECIFIED ABNORMAL IMMUNOLOGICAL FINDINGS IN SERUM: ICD-10-CM

## 2024-11-13 DIAGNOSIS — R23.3 SPONTANEOUS ECCHYMOSES: ICD-10-CM

## 2024-11-13 DIAGNOSIS — E53.8 DEFICIENCY OF OTHER SPECIFIED B GROUP VITAMINS: ICD-10-CM

## 2024-11-13 LAB
BASOPHILS # BLD AUTO: 0.1 K/UL — SIGNIFICANT CHANGE UP (ref 0–0.2)
BASOPHILS NFR BLD AUTO: 1.4 % — SIGNIFICANT CHANGE UP (ref 0–2)
EOSINOPHIL # BLD AUTO: 0.5 K/UL — SIGNIFICANT CHANGE UP (ref 0–0.5)
EOSINOPHIL NFR BLD AUTO: 6.1 % — HIGH (ref 0–6)
HCT VFR BLD CALC: 42.6 % — SIGNIFICANT CHANGE UP (ref 34.5–45)
HGB BLD-MCNC: 14.3 G/DL — SIGNIFICANT CHANGE UP (ref 11.5–15.5)
LYMPHOCYTES # BLD AUTO: 0.9 K/UL — LOW (ref 1–3.3)
LYMPHOCYTES # BLD AUTO: 12 % — LOW (ref 13–44)
MCHC RBC-ENTMCNC: 29 PG — SIGNIFICANT CHANGE UP (ref 27–34)
MCHC RBC-ENTMCNC: 33.6 G/DL — SIGNIFICANT CHANGE UP (ref 32–36)
MCV RBC AUTO: 86.3 FL — SIGNIFICANT CHANGE UP (ref 80–100)
MONOCYTES # BLD AUTO: 0.5 K/UL — SIGNIFICANT CHANGE UP (ref 0–0.9)
MONOCYTES NFR BLD AUTO: 6.1 % — SIGNIFICANT CHANGE UP (ref 2–14)
NEUTROPHILS # BLD AUTO: 5.8 K/UL — SIGNIFICANT CHANGE UP (ref 1.8–7.4)
NEUTROPHILS NFR BLD AUTO: 74.4 % — SIGNIFICANT CHANGE UP (ref 43–77)
PLATELET # BLD AUTO: 345 K/UL — SIGNIFICANT CHANGE UP (ref 150–400)
RBC # BLD: 4.94 M/UL — SIGNIFICANT CHANGE UP (ref 3.8–5.2)
RBC # FLD: 13.5 % — SIGNIFICANT CHANGE UP (ref 10.3–14.5)
WBC # BLD: 7.7 K/UL — SIGNIFICANT CHANGE UP (ref 3.8–10.5)
WBC # FLD AUTO: 7.7 K/UL — SIGNIFICANT CHANGE UP (ref 3.8–10.5)

## 2024-11-13 PROCEDURE — 99214 OFFICE O/P EST MOD 30 MIN: CPT

## 2024-11-14 LAB
ALBUMIN SERPL ELPH-MCNC: 4.3 G/DL
ALP BLD-CCNC: 156 U/L
ALT SERPL-CCNC: 13 U/L
ANION GAP SERPL CALC-SCNC: 14 MMOL/L
APTT BLD: 30.6 SEC
AST SERPL-CCNC: 23 U/L
BILIRUB SERPL-MCNC: 0.6 MG/DL
BUN SERPL-MCNC: 20 MG/DL
CALCIUM SERPL-MCNC: 10.1 MG/DL
CHLORIDE SERPL-SCNC: 95 MMOL/L
CO2 SERPL-SCNC: 24 MMOL/L
CREAT SERPL-MCNC: 0.98 MG/DL
EGFR: 60 ML/MIN/1.73M2
FOLATE SERPL-MCNC: 8.9 NG/ML
GLUCOSE SERPL-MCNC: 125 MG/DL
INR PPP: 0.88 RATIO
POTASSIUM SERPL-SCNC: 4.1 MMOL/L
PROT SERPL-MCNC: 7.8 G/DL
PT BLD: 10.5 SEC
SODIUM SERPL-SCNC: 134 MMOL/L
VIT B12 SERPL-MCNC: 1145 PG/ML

## 2024-11-15 ENCOUNTER — NON-APPOINTMENT (OUTPATIENT)
Age: 74
End: 2024-11-15

## 2024-11-15 ENCOUNTER — APPOINTMENT (OUTPATIENT)
Dept: FAMILY MEDICINE | Facility: CLINIC | Age: 74
End: 2024-11-15
Payer: MEDICARE

## 2024-11-15 VITALS
BODY MASS INDEX: 28.28 KG/M2 | RESPIRATION RATE: 14 BRPM | WEIGHT: 176 LBS | DIASTOLIC BLOOD PRESSURE: 78 MMHG | OXYGEN SATURATION: 94 % | HEART RATE: 66 BPM | HEIGHT: 66 IN | SYSTOLIC BLOOD PRESSURE: 122 MMHG

## 2024-11-15 DIAGNOSIS — M65.30 TRIGGER FINGER, UNSPECIFIED FINGER: ICD-10-CM

## 2024-11-15 DIAGNOSIS — Z01.818 ENCOUNTER FOR OTHER PREPROCEDURAL EXAMINATION: ICD-10-CM

## 2024-11-15 DIAGNOSIS — E11.9 TYPE 2 DIABETES MELLITUS W/OUT COMPLICATIONS: ICD-10-CM

## 2024-11-15 LAB — HBA1C MFR BLD HPLC: 6.4

## 2024-11-15 PROCEDURE — G2211 COMPLEX E/M VISIT ADD ON: CPT

## 2024-11-15 PROCEDURE — 93000 ELECTROCARDIOGRAM COMPLETE: CPT

## 2024-11-15 PROCEDURE — 83036 HEMOGLOBIN GLYCOSYLATED A1C: CPT | Mod: QW

## 2024-11-15 PROCEDURE — 99214 OFFICE O/P EST MOD 30 MIN: CPT

## 2024-11-16 LAB
ALBUMIN MFR SERPL ELPH: 51.3 %
ALBUMIN SERPL-MCNC: 4 G/DL
ALBUMIN/GLOB SERPL: 1.1 RATIO
ALPHA1 GLOB MFR SERPL ELPH: 5.1 %
ALPHA1 GLOB SERPL ELPH-MCNC: 0.4 G/DL
ALPHA2 GLOB MFR SERPL ELPH: 13.1 %
ALPHA2 GLOB SERPL ELPH-MCNC: 1 G/DL
B-GLOBULIN MFR SERPL ELPH: 14.1 %
B-GLOBULIN SERPL ELPH-MCNC: 1.1 G/DL
DEPRECATED KAPPA LC FREE/LAMBDA SER: 1.32 RATIO
GAMMA GLOB FLD ELPH-MCNC: 1.3 G/DL
GAMMA GLOB MFR SERPL ELPH: 16.4 %
IGA SER QL IEP: 438 MG/DL
IGG SER QL IEP: 1189 MG/DL
IGM SER QL IEP: 158 MG/DL
INTERPRETATION SERPL IEP-IMP: NORMAL
KAPPA LC CSF-MCNC: 2.44 MG/DL
KAPPA LC SERPL-MCNC: 3.23 MG/DL
M PROTEIN SPEC IFE-MCNC: NORMAL
PROT SERPL-MCNC: 7.8 G/DL
PROT SERPL-MCNC: 7.8 G/DL

## 2024-11-21 ENCOUNTER — RX RENEWAL (OUTPATIENT)
Age: 74
End: 2024-11-21

## 2024-11-25 ENCOUNTER — APPOINTMENT (OUTPATIENT)
Dept: OTOLARYNGOLOGY | Facility: CLINIC | Age: 74
End: 2024-11-25
Payer: MEDICARE

## 2024-11-25 VITALS
SYSTOLIC BLOOD PRESSURE: 126 MMHG | HEIGHT: 66 IN | HEART RATE: 50 BPM | BODY MASS INDEX: 28.12 KG/M2 | WEIGHT: 175 LBS | DIASTOLIC BLOOD PRESSURE: 72 MMHG

## 2024-11-25 DIAGNOSIS — J01.90 ACUTE SINUSITIS, UNSPECIFIED: ICD-10-CM

## 2024-11-25 PROCEDURE — 99213 OFFICE O/P EST LOW 20 MIN: CPT

## 2024-12-02 ENCOUNTER — TRANSCRIPTION ENCOUNTER (OUTPATIENT)
Age: 74
End: 2024-12-02

## 2024-12-17 ENCOUNTER — APPOINTMENT (OUTPATIENT)
Dept: FAMILY MEDICINE | Facility: CLINIC | Age: 74
End: 2024-12-17

## 2024-12-24 ENCOUNTER — APPOINTMENT (OUTPATIENT)
Dept: OTOLARYNGOLOGY | Facility: CLINIC | Age: 74
End: 2024-12-24
Payer: MEDICARE

## 2024-12-24 VITALS
HEART RATE: 52 BPM | DIASTOLIC BLOOD PRESSURE: 72 MMHG | BODY MASS INDEX: 28.28 KG/M2 | HEIGHT: 66 IN | SYSTOLIC BLOOD PRESSURE: 111 MMHG | WEIGHT: 176 LBS

## 2024-12-24 DIAGNOSIS — J31.0 CHRONIC RHINITIS: ICD-10-CM

## 2024-12-24 PROCEDURE — 99213 OFFICE O/P EST LOW 20 MIN: CPT

## 2024-12-24 RX ORDER — MUPIROCIN 20 MG/G
2 OINTMENT TOPICAL 3 TIMES DAILY
Qty: 1 | Refills: 1 | Status: ACTIVE | COMMUNITY
Start: 2024-12-24 | End: 1900-01-01

## 2024-12-28 ENCOUNTER — RX RENEWAL (OUTPATIENT)
Age: 74
End: 2024-12-28

## 2025-01-13 ENCOUNTER — TRANSCRIPTION ENCOUNTER (OUTPATIENT)
Age: 75
End: 2025-01-13

## 2025-01-13 ENCOUNTER — RX RENEWAL (OUTPATIENT)
Age: 75
End: 2025-01-13

## 2025-01-14 ENCOUNTER — APPOINTMENT (OUTPATIENT)
Dept: FAMILY MEDICINE | Facility: CLINIC | Age: 75
End: 2025-01-14

## 2025-02-01 ENCOUNTER — RX RENEWAL (OUTPATIENT)
Age: 75
End: 2025-02-01

## 2025-02-20 ENCOUNTER — RX RENEWAL (OUTPATIENT)
Age: 75
End: 2025-02-20

## 2025-02-27 ENCOUNTER — APPOINTMENT (OUTPATIENT)
Dept: OTOLARYNGOLOGY | Facility: CLINIC | Age: 75
End: 2025-02-27
Payer: MEDICARE

## 2025-02-27 ENCOUNTER — NON-APPOINTMENT (OUTPATIENT)
Age: 75
End: 2025-02-27

## 2025-02-27 VITALS
HEART RATE: 57 BPM | SYSTOLIC BLOOD PRESSURE: 135 MMHG | BODY MASS INDEX: 28.12 KG/M2 | HEIGHT: 66 IN | WEIGHT: 175 LBS | DIASTOLIC BLOOD PRESSURE: 76 MMHG

## 2025-02-27 DIAGNOSIS — J31.0 CHRONIC RHINITIS: ICD-10-CM

## 2025-02-27 DIAGNOSIS — J01.90 ACUTE SINUSITIS, UNSPECIFIED: ICD-10-CM

## 2025-02-27 PROCEDURE — 99214 OFFICE O/P EST MOD 30 MIN: CPT

## 2025-03-06 ENCOUNTER — APPOINTMENT (OUTPATIENT)
Dept: OTOLARYNGOLOGY | Facility: CLINIC | Age: 75
End: 2025-03-06

## 2025-03-07 ENCOUNTER — APPOINTMENT (OUTPATIENT)
Dept: FAMILY MEDICINE | Facility: CLINIC | Age: 75
End: 2025-03-07
Payer: MEDICARE

## 2025-03-07 VITALS
HEART RATE: 60 BPM | TEMPERATURE: 97.2 F | HEIGHT: 66 IN | OXYGEN SATURATION: 96 % | SYSTOLIC BLOOD PRESSURE: 122 MMHG | BODY MASS INDEX: 27.97 KG/M2 | DIASTOLIC BLOOD PRESSURE: 62 MMHG | RESPIRATION RATE: 14 BRPM | WEIGHT: 174 LBS

## 2025-03-07 DIAGNOSIS — Z00.00 ENCOUNTER FOR GENERAL ADULT MEDICAL EXAMINATION W/OUT ABNORMAL FINDINGS: ICD-10-CM

## 2025-03-07 PROCEDURE — 99214 OFFICE O/P EST MOD 30 MIN: CPT

## 2025-03-07 PROCEDURE — 36415 COLL VENOUS BLD VENIPUNCTURE: CPT

## 2025-03-07 PROCEDURE — 83036 HEMOGLOBIN GLYCOSYLATED A1C: CPT | Mod: QW

## 2025-03-07 RX ORDER — DOXYCYCLINE HYCLATE 100 MG/1
100 CAPSULE ORAL
Qty: 14 | Refills: 0 | Status: DISCONTINUED | COMMUNITY
Start: 2025-02-27 | End: 2025-03-07

## 2025-03-10 LAB
25(OH)D3 SERPL-MCNC: 47.4 NG/ML
ALBUMIN SERPL ELPH-MCNC: 4.1 G/DL
ALP BLD-CCNC: 160 U/L
ALT SERPL-CCNC: 16 U/L
ANION GAP SERPL CALC-SCNC: 17 MMOL/L
APPEARANCE: CLEAR
AST SERPL-CCNC: 22 U/L
BASOPHILS # BLD AUTO: 0.05 K/UL
BASOPHILS NFR BLD AUTO: 0.8 %
BILIRUB SERPL-MCNC: 0.4 MG/DL
BILIRUBIN URINE: NEGATIVE
BLOOD URINE: NEGATIVE
BUN SERPL-MCNC: 26 MG/DL
CALCIUM SERPL-MCNC: 10.1 MG/DL
CHLORIDE SERPL-SCNC: 95 MMOL/L
CHOLEST SERPL-MCNC: 185 MG/DL
CO2 SERPL-SCNC: 21 MMOL/L
COLOR: YELLOW
CREAT SERPL-MCNC: 0.93 MG/DL
EGFRCR SERPLBLD CKD-EPI 2021: 64 ML/MIN/1.73M2
EOSINOPHIL # BLD AUTO: 0.25 K/UL
EOSINOPHIL NFR BLD AUTO: 3.9 %
GLUCOSE QUALITATIVE U: >=1000 MG/DL
GLUCOSE SERPL-MCNC: 112 MG/DL
HCT VFR BLD CALC: 42 %
HDLC SERPL-MCNC: 97 MG/DL
HGB BLD-MCNC: 13.8 G/DL
IMM GRANULOCYTES NFR BLD AUTO: 0.2 %
KETONES URINE: NEGATIVE MG/DL
LDLC SERPL CALC-MCNC: 77 MG/DL
LEUKOCYTE ESTERASE URINE: NEGATIVE
LYMPHOCYTES # BLD AUTO: 1.31 K/UL
LYMPHOCYTES NFR BLD AUTO: 20.5 %
MAN DIFF?: NORMAL
MCHC RBC-ENTMCNC: 28.6 PG
MCHC RBC-ENTMCNC: 32.9 G/DL
MCV RBC AUTO: 87 FL
MONOCYTES # BLD AUTO: 0.49 K/UL
MONOCYTES NFR BLD AUTO: 7.7 %
NEUTROPHILS # BLD AUTO: 4.29 K/UL
NEUTROPHILS NFR BLD AUTO: 66.9 %
NITRITE URINE: NEGATIVE
NONHDLC SERPL-MCNC: 87 MG/DL
PH URINE: 7
PLATELET # BLD AUTO: 248 K/UL
POTASSIUM SERPL-SCNC: 4.1 MMOL/L
PROT SERPL-MCNC: 7.6 G/DL
PROTEIN URINE: NEGATIVE MG/DL
RBC # BLD: 4.83 M/UL
RBC # FLD: 13.7 %
SODIUM SERPL-SCNC: 133 MMOL/L
SPECIFIC GRAVITY URINE: 1.02
TRIGL SERPL-MCNC: 50 MG/DL
TSH SERPL-ACNC: 4.43 UIU/ML
UROBILINOGEN URINE: 1 MG/DL
WBC # FLD AUTO: 6.4 K/UL

## 2025-03-31 ENCOUNTER — RX RENEWAL (OUTPATIENT)
Age: 75
End: 2025-03-31

## 2025-03-31 ENCOUNTER — TRANSCRIPTION ENCOUNTER (OUTPATIENT)
Age: 75
End: 2025-03-31

## 2025-05-01 ENCOUNTER — RX RENEWAL (OUTPATIENT)
Age: 75
End: 2025-05-01

## 2025-05-02 ENCOUNTER — APPOINTMENT (OUTPATIENT)
Dept: OTOLARYNGOLOGY | Facility: CLINIC | Age: 75
End: 2025-05-02
Payer: MEDICARE

## 2025-05-02 VITALS
SYSTOLIC BLOOD PRESSURE: 123 MMHG | WEIGHT: 176 LBS | DIASTOLIC BLOOD PRESSURE: 71 MMHG | HEART RATE: 61 BPM | HEIGHT: 66 IN | BODY MASS INDEX: 28.28 KG/M2

## 2025-05-02 DIAGNOSIS — J31.0 CHRONIC RHINITIS: ICD-10-CM

## 2025-05-02 DIAGNOSIS — H61.21 IMPACTED CERUMEN, RIGHT EAR: ICD-10-CM

## 2025-05-02 DIAGNOSIS — J01.00 ACUTE MAXILLARY SINUSITIS, UNSPECIFIED: ICD-10-CM

## 2025-05-02 DIAGNOSIS — L73.9 FOLLICULAR DISORDER, UNSPECIFIED: ICD-10-CM

## 2025-05-02 PROCEDURE — 99213 OFFICE O/P EST LOW 20 MIN: CPT | Mod: 25

## 2025-05-02 PROCEDURE — 69210 REMOVE IMPACTED EAR WAX UNI: CPT | Mod: RT

## 2025-05-02 RX ORDER — MUPIROCIN 20 MG/G
2 OINTMENT TOPICAL
Qty: 22 | Refills: 3 | Status: ACTIVE | COMMUNITY
Start: 2025-05-02 | End: 1900-01-01

## 2025-05-02 RX ORDER — DOXYCYCLINE 100 MG/1
100 CAPSULE ORAL
Qty: 20 | Refills: 0 | Status: ACTIVE | COMMUNITY
Start: 2025-05-02 | End: 1900-01-01

## 2025-05-19 ENCOUNTER — RX RENEWAL (OUTPATIENT)
Age: 75
End: 2025-05-19

## 2025-06-18 ENCOUNTER — NON-APPOINTMENT (OUTPATIENT)
Age: 75
End: 2025-06-18

## 2025-06-24 ENCOUNTER — APPOINTMENT (OUTPATIENT)
Dept: FAMILY MEDICINE | Facility: CLINIC | Age: 75
End: 2025-06-24
Payer: MEDICARE

## 2025-06-24 VITALS
BODY MASS INDEX: 28.61 KG/M2 | OXYGEN SATURATION: 97 % | TEMPERATURE: 97.2 F | HEART RATE: 78 BPM | WEIGHT: 178 LBS | HEIGHT: 66 IN | DIASTOLIC BLOOD PRESSURE: 80 MMHG | SYSTOLIC BLOOD PRESSURE: 110 MMHG

## 2025-06-24 PROCEDURE — 99214 OFFICE O/P EST MOD 30 MIN: CPT

## 2025-06-24 PROCEDURE — 83036 HEMOGLOBIN GLYCOSYLATED A1C: CPT | Mod: QW

## 2025-06-24 PROCEDURE — G2211 COMPLEX E/M VISIT ADD ON: CPT

## 2025-06-25 ENCOUNTER — RX RENEWAL (OUTPATIENT)
Age: 75
End: 2025-06-25

## 2025-06-30 ENCOUNTER — APPOINTMENT (OUTPATIENT)
Dept: FAMILY MEDICINE | Facility: CLINIC | Age: 75
End: 2025-06-30
Payer: MEDICARE

## 2025-06-30 VITALS
DIASTOLIC BLOOD PRESSURE: 68 MMHG | HEIGHT: 66 IN | SYSTOLIC BLOOD PRESSURE: 122 MMHG | HEART RATE: 62 BPM | BODY MASS INDEX: 28.61 KG/M2 | RESPIRATION RATE: 15 BRPM | TEMPERATURE: 97.3 F | WEIGHT: 178 LBS | OXYGEN SATURATION: 96 %

## 2025-06-30 PROCEDURE — G2211 COMPLEX E/M VISIT ADD ON: CPT

## 2025-06-30 PROCEDURE — 99214 OFFICE O/P EST MOD 30 MIN: CPT

## 2025-06-30 PROCEDURE — 36415 COLL VENOUS BLD VENIPUNCTURE: CPT

## 2025-06-30 RX ORDER — CEPHALEXIN 500 MG/1
500 CAPSULE ORAL 3 TIMES DAILY
Qty: 21 | Refills: 0 | Status: ACTIVE | COMMUNITY
Start: 2025-06-30 | End: 1900-01-01

## 2025-06-30 RX ORDER — DOXYCYCLINE HYCLATE 100 MG/1
100 CAPSULE ORAL
Qty: 20 | Refills: 0 | Status: ACTIVE | COMMUNITY
Start: 2025-06-30 | End: 1900-01-01

## 2025-07-01 ENCOUNTER — TRANSCRIPTION ENCOUNTER (OUTPATIENT)
Age: 75
End: 2025-07-01

## 2025-07-01 LAB
B BURGDOR AB SER-IMP: NEGATIVE
B BURGDOR IGG+IGM SER QL: 0.06 INDEX
BASOPHILS # BLD AUTO: 0.05 K/UL
BASOPHILS NFR BLD AUTO: 0.6 %
EOSINOPHIL # BLD AUTO: 0.21 K/UL
EOSINOPHIL NFR BLD AUTO: 2.7 %
HCT VFR BLD CALC: 44.7 %
HGB BLD-MCNC: 14.3 G/DL
IMM GRANULOCYTES NFR BLD AUTO: 0.1 %
LYMPHOCYTES # BLD AUTO: 1.06 K/UL
LYMPHOCYTES NFR BLD AUTO: 13.4 %
MAN DIFF?: NORMAL
MCHC RBC-ENTMCNC: 29.1 PG
MCHC RBC-ENTMCNC: 32 G/DL
MCV RBC AUTO: 91 FL
MONOCYTES # BLD AUTO: 0.64 K/UL
MONOCYTES NFR BLD AUTO: 8.1 %
NEUTROPHILS # BLD AUTO: 5.93 K/UL
NEUTROPHILS NFR BLD AUTO: 75.1 %
PLATELET # BLD AUTO: 275 K/UL
RBC # BLD: 4.91 M/UL
RBC # FLD: 14.2 %
WBC # FLD AUTO: 7.9 K/UL

## 2025-07-02 ENCOUNTER — TRANSCRIPTION ENCOUNTER (OUTPATIENT)
Age: 75
End: 2025-07-02

## 2025-07-23 ENCOUNTER — APPOINTMENT (OUTPATIENT)
Dept: FAMILY MEDICINE | Facility: CLINIC | Age: 75
End: 2025-07-23
Payer: MEDICARE

## 2025-07-23 VITALS
SYSTOLIC BLOOD PRESSURE: 130 MMHG | WEIGHT: 176 LBS | OXYGEN SATURATION: 98 % | BODY MASS INDEX: 28.28 KG/M2 | HEART RATE: 56 BPM | HEIGHT: 66 IN | DIASTOLIC BLOOD PRESSURE: 76 MMHG
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
## 2025-07-23 DIAGNOSIS — E11.9 TYPE 2 DIABETES MELLITUS W/OUT COMPLICATIONS: ICD-10-CM

## 2025-07-23 DIAGNOSIS — S80.869A INSECT BITE (NONVENOMOUS), UNSPECIFIED LOWER LEG, INITIAL ENCOUNTER: ICD-10-CM

## 2025-07-23 PROCEDURE — 99214 OFFICE O/P EST MOD 30 MIN: CPT

## 2025-07-23 PROCEDURE — 36415 COLL VENOUS BLD VENIPUNCTURE: CPT

## 2025-07-23 PROCEDURE — G2211 COMPLEX E/M VISIT ADD ON: CPT

## 2025-07-23 RX ORDER — BETAMETHASONE DIPROPIONATE 0.5 MG/G
0.05 CREAM TOPICAL TWICE DAILY
Qty: 1 | Refills: 3 | Status: ACTIVE | COMMUNITY
Start: 2025-07-23 | End: 1900-01-01

## 2025-07-24 LAB
B BURGDOR AB SER-IMP: NEGATIVE
B BURGDOR IGG+IGM SER QL: 0.08 INDEX
BASOPHILS # BLD AUTO: 0.05 K/UL
BASOPHILS NFR BLD AUTO: 0.7 %
EOSINOPHIL # BLD AUTO: 0.26 K/UL
EOSINOPHIL NFR BLD AUTO: 3.8 %
HCT VFR BLD CALC: 43.3 %
HGB BLD-MCNC: 14.2 G/DL
IMM GRANULOCYTES NFR BLD AUTO: 0.4 %
LYMPHOCYTES # BLD AUTO: 1.01 K/UL
LYMPHOCYTES NFR BLD AUTO: 14.8 %
MAN DIFF?: NORMAL
MCHC RBC-ENTMCNC: 28.6 PG
MCHC RBC-ENTMCNC: 32.8 G/DL
MCV RBC AUTO: 87.1 FL
MONOCYTES # BLD AUTO: 0.47 K/UL
MONOCYTES NFR BLD AUTO: 6.9 %
NEUTROPHILS # BLD AUTO: 5.02 K/UL
NEUTROPHILS NFR BLD AUTO: 73.4 %
PLATELET # BLD AUTO: 245 K/UL
RBC # BLD: 4.97 M/UL
RBC # FLD: 14.1 %
WBC # FLD AUTO: 6.84 K/UL

## 2025-08-04 ENCOUNTER — NON-APPOINTMENT (OUTPATIENT)
Age: 75
End: 2025-08-04

## 2025-08-14 ENCOUNTER — APPOINTMENT (OUTPATIENT)
Dept: FAMILY MEDICINE | Facility: CLINIC | Age: 75
End: 2025-08-14

## 2025-08-18 ENCOUNTER — RX RENEWAL (OUTPATIENT)
Age: 75
End: 2025-08-18

## (undated) DEVICE — SUT VICRYL 8-0 12" TG140-8 DA

## (undated) DEVICE — DRAPE STERI-DRAPE INCISE 13X13"

## (undated) DEVICE — SOL IRR BAL SALT + 500ML

## (undated) DEVICE — CONSTELLATION TOTAL PLUS PAK 23G

## (undated) DEVICE — CANNULA MEDONE FLEXTIP 25G

## (undated) DEVICE — VENODYNE/SCD SLEEVE CALF MEDIUM

## (undated) DEVICE — DRSG MASTISOL

## (undated) DEVICE — LIGHT SHIELD CORNEAL

## (undated) DEVICE — GLV 7.5 PROTEXIS (WHITE)

## (undated) DEVICE — PACK VITRECTOMY

## (undated) DEVICE — WARMING BLANKET LOWER ADULT

## (undated) DEVICE — CONSTELLATION TOTAL PLUS PAK 25G VALVED 7500CPM

## (undated) DEVICE — NDL HYPO SAFE 22G X 1.5" (BLACK)